# Patient Record
Sex: MALE | Race: WHITE | NOT HISPANIC OR LATINO | Employment: OTHER | URBAN - METROPOLITAN AREA
[De-identification: names, ages, dates, MRNs, and addresses within clinical notes are randomized per-mention and may not be internally consistent; named-entity substitution may affect disease eponyms.]

---

## 2017-08-18 ENCOUNTER — GENERIC CONVERSION - ENCOUNTER (OUTPATIENT)
Dept: OTHER | Facility: OTHER | Age: 82
End: 2017-08-18

## 2018-01-13 NOTE — MISCELLANEOUS
Message  The patient had missed his last appointment on 9/21/16  I spoke with the nursing home and the patient is on Hospice  [facility]      Active Problems    1  Acquired solitary kidney (V45 73) (Z90 5)   2  Allergic rhinitis (477 9) (J30 9)   3  Aneurysm of abdominal aorta (441 4) (I71 4)   4  Arteriosclerosis of coronary artery (414 00) (I25 10)   5  Asbestosis ([id number]) (Yamini Ferrera)   6  BPPV (benign paroxysmal positional vertigo) (386 11) (H81 10)   7  Cholelithiasis without cholecystitis (574 20) (K80 20)   8  Chronic rhinitis (472 0) (J31 0)   9  CKD (chronic kidney disease), stage IV (585 4) (N18 4)   10  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 2) (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 2)   11  Esophageal reflux (530 81) (K21 9)   12  Flu vaccine need (V04 81) (Z23)   13  Glaucoma (365 9) (H40 9)   14  Hyperlipidemia (272 4) (E78 5)   15  Hypertension (401 9) (I10)   16  Murmur (785 2) (R01 1)   17  History of Nocturnal hypoxia (327 24)   18  Renal insufficiency (593 9) (N28 9)   19  Shortness of breath (786 05) (R06 02)   20  Urinary retention (788 20) (R33 9)    Current Meds   1  AmLODIPine Besylate 5 MG Oral Tablet; Take 1 tablet daily; Therapy: (Recorded:23Jun2016) to Recorded   2  Aspirin 81 MG TABS; TAKE 1 TABLET DAILY; Therapy: 86Otg0103 to (Evaluate:01Kas4224) Recorded   3  Atorvastatin Calcium 40 MG Oral Tablet; TAKE ONE TABLET BY MOUTH ONCE DAILY; Therapy: 75ISE0923 to (Evaluate:19Nov2016)  Requested for: 67QDC1792; Last   Rx:25Nov2015 Ordered   4  Finasteride 5 MG Oral Tablet; TAKE 1 TABLET DAILY; Therapy: 22RNV4305 to (Evaluate:19Nov2016)  Requested for: 51IBS5552; Last   Rx:25Nov2015 Ordered   5  HydrALAZINE HCl - 10 MG Oral Tablet; TAKE 1 TABLET TWICE DAILY; Therapy: 11LGQ4577 to (Evaluate:19Nov2016)  Requested for: 53SYB2205; Last   Rx:25Nov2015 Ordered   6  Latanoprost 0 005 % Ophthalmic Solution; Therapy: 10Apr2014 to Recorded   7   Metoprolol Succinate ER 25 MG Oral Tablet Extended Release 24 Hour; TAKE 1 TABLET DAILY; Therapy: (PVGOHVZL:67KVZ6498) to Recorded   8  Omeprazole 40 MG Oral Capsule Delayed Release; TAKE 1 CAPSULE twice  DAILY; Therapy: 99ILO1382 to (Yasmeen Garsia)  Requested for: 13NDO9666; Last   Rx:02Olt0996 Ordered   9  Sodium Chloride 0 9 % Inhalation Nebulization Solution; use twice a day in nebulizer as   directed; Therapy: 74ZIJ9592 to (Last Rx:75Tty9278) Ordered    Allergies    1  No Known Drug Allergies    Signatures   Electronically signed by :  FELI Marsh ; Aug 28 2017  8:48AM EST

## 2018-08-13 ENCOUNTER — APPOINTMENT (EMERGENCY)
Dept: RADIOLOGY | Facility: HOSPITAL | Age: 83
DRG: 682 | End: 2018-08-13
Payer: COMMERCIAL

## 2018-08-13 ENCOUNTER — APPOINTMENT (INPATIENT)
Dept: CT IMAGING | Facility: HOSPITAL | Age: 83
DRG: 682 | End: 2018-08-13
Payer: COMMERCIAL

## 2018-08-13 ENCOUNTER — HOSPITAL ENCOUNTER (INPATIENT)
Facility: HOSPITAL | Age: 83
LOS: 4 days | Discharge: HOME | DRG: 682 | End: 2018-08-17
Attending: EMERGENCY MEDICINE | Admitting: HOSPITALIST
Payer: COMMERCIAL

## 2018-08-13 DIAGNOSIS — N17.9 ACUTE KIDNEY INJURY (HCC): ICD-10-CM

## 2018-08-13 DIAGNOSIS — R41.0 CONFUSION: Primary | ICD-10-CM

## 2018-08-13 DIAGNOSIS — E87.70 VOLUME OVERLOAD: ICD-10-CM

## 2018-08-13 DIAGNOSIS — I16.0 HYPERTENSIVE URGENCY: ICD-10-CM

## 2018-08-13 DIAGNOSIS — E86.0 DEHYDRATION: ICD-10-CM

## 2018-08-13 LAB
ALBUMIN SERPL BCP-MCNC: 2.9 G/DL (ref 3.5–5)
ALP SERPL-CCNC: 202 U/L (ref 46–116)
ALT SERPL W P-5'-P-CCNC: 22 U/L (ref 12–78)
ANION GAP SERPL CALCULATED.3IONS-SCNC: 11 MMOL/L (ref 4–13)
AST SERPL W P-5'-P-CCNC: 31 U/L (ref 5–45)
BACTERIA UR QL AUTO: ABNORMAL /HPF
BACTERIA UR QL AUTO: ABNORMAL /HPF
BASE EX.OXY STD BLDV CALC-SCNC: 80.4 % (ref 60–80)
BASE EXCESS BLDV CALC-SCNC: -8 MMOL/L
BASOPHILS # BLD AUTO: 0.02 THOUSANDS/ΜL (ref 0–0.1)
BASOPHILS NFR BLD AUTO: 0 % (ref 0–1)
BILIRUB DIRECT SERPL-MCNC: 0.14 MG/DL (ref 0–0.2)
BILIRUB SERPL-MCNC: 0.49 MG/DL (ref 0.2–1)
BILIRUB UR QL STRIP: NEGATIVE
BUN SERPL-MCNC: 54 MG/DL (ref 5–25)
CALCIUM SERPL-MCNC: 9.1 MG/DL (ref 8.3–10.1)
CHLORIDE SERPL-SCNC: 105 MMOL/L (ref 100–108)
CLARITY UR: CLEAR
CO2 SERPL-SCNC: 20 MMOL/L (ref 21–32)
COLOR UR: YELLOW
CREAT SERPL-MCNC: 4.32 MG/DL (ref 0.6–1.3)
EOSINOPHIL # BLD AUTO: 0.19 THOUSAND/ΜL (ref 0–0.61)
EOSINOPHIL NFR BLD AUTO: 3 % (ref 0–6)
ERYTHROCYTE [DISTWIDTH] IN BLOOD BY AUTOMATED COUNT: 13.8 % (ref 11.6–15.1)
GFR SERPL CREATININE-BSD FRML MDRD: 11 ML/MIN/1.73SQ M
GLUCOSE SERPL-MCNC: 95 MG/DL (ref 65–140)
GLUCOSE UR STRIP-MCNC: NEGATIVE MG/DL
HCO3 BLDV-SCNC: 17.5 MMOL/L (ref 24–30)
HCT VFR BLD AUTO: 31.3 % (ref 36.5–49.3)
HGB BLD-MCNC: 10.4 G/DL (ref 12–17)
HGB UR QL STRIP.AUTO: ABNORMAL
HGB UR QL STRIP.AUTO: NEGATIVE
HGB UR QL STRIP.AUTO: NEGATIVE
KETONES UR STRIP-MCNC: NEGATIVE MG/DL
LACTATE SERPL-SCNC: 1.1 MMOL/L (ref 0.5–2)
LEUKOCYTE ESTERASE UR QL STRIP: NEGATIVE
LIPASE SERPL-CCNC: 302 U/L (ref 73–393)
LYMPHOCYTES # BLD AUTO: 1.5 THOUSANDS/ΜL (ref 0.6–4.47)
LYMPHOCYTES NFR BLD AUTO: 24 % (ref 14–44)
MAGNESIUM SERPL-MCNC: 1.4 MG/DL (ref 1.6–2.6)
MCH RBC QN AUTO: 31 PG (ref 26.8–34.3)
MCHC RBC AUTO-ENTMCNC: 33.2 G/DL (ref 31.4–37.4)
MCV RBC AUTO: 93 FL (ref 82–98)
MONOCYTES # BLD AUTO: 0.71 THOUSAND/ΜL (ref 0.17–1.22)
MONOCYTES NFR BLD AUTO: 11 % (ref 4–12)
NEUTROPHILS # BLD AUTO: 3.84 THOUSANDS/ΜL (ref 1.85–7.62)
NEUTS SEG NFR BLD AUTO: 61 % (ref 43–75)
NITRITE UR QL STRIP: NEGATIVE
NON-SQ EPI CELLS URNS QL MICRO: ABNORMAL /HPF
NON-SQ EPI CELLS URNS QL MICRO: ABNORMAL /HPF
NRBC BLD AUTO-RTO: 0 /100 WBCS
NT-PROBNP SERPL-MCNC: ABNORMAL PG/ML
O2 CT BLDV-SCNC: 12.8 ML/DL
PCO2 BLDV: 35.8 MM HG (ref 42–50)
PH BLDV: 7.31 [PH] (ref 7.3–7.4)
PH UR STRIP.AUTO: 5.5 [PH] (ref 4.5–8)
PH UR STRIP.AUTO: 6 [PH] (ref 4.5–8)
PH UR STRIP.AUTO: 6 [PH] (ref 4.5–8)
PLATELET # BLD AUTO: 183 THOUSANDS/UL (ref 149–390)
PLATELET # BLD AUTO: 183 THOUSANDS/UL (ref 149–390)
PMV BLD AUTO: 11.9 FL (ref 8.9–12.7)
PMV BLD AUTO: 12.1 FL (ref 8.9–12.7)
PO2 BLDV: 46.1 MM HG (ref 35–45)
POTASSIUM SERPL-SCNC: 4.8 MMOL/L (ref 3.5–5.3)
PROT SERPL-MCNC: 6.7 G/DL (ref 6.4–8.2)
PROT UR STRIP-MCNC: >=300 MG/DL
PROT UR STRIP-MCNC: ABNORMAL MG/DL
PROT UR STRIP-MCNC: ABNORMAL MG/DL
RBC # BLD AUTO: 3.35 MILLION/UL (ref 3.88–5.62)
RBC #/AREA URNS AUTO: ABNORMAL /HPF
RBC #/AREA URNS AUTO: ABNORMAL /HPF
SODIUM SERPL-SCNC: 136 MMOL/L (ref 136–145)
SP GR UR STRIP.AUTO: 1.02 (ref 1–1.03)
TROPONIN I SERPL-MCNC: 0.05 NG/ML
TSH SERPL DL<=0.05 MIU/L-ACNC: 6.2 UIU/ML (ref 0.36–3.74)
UROBILINOGEN UR QL STRIP.AUTO: 0.2 E.U./DL
WBC # BLD AUTO: 6.26 THOUSAND/UL (ref 4.31–10.16)
WBC #/AREA URNS AUTO: ABNORMAL /HPF
WBC #/AREA URNS AUTO: ABNORMAL /HPF

## 2018-08-13 PROCEDURE — 83880 ASSAY OF NATRIURETIC PEPTIDE: CPT | Performed by: EMERGENCY MEDICINE

## 2018-08-13 PROCEDURE — 80076 HEPATIC FUNCTION PANEL: CPT | Performed by: EMERGENCY MEDICINE

## 2018-08-13 PROCEDURE — 80048 BASIC METABOLIC PNL TOTAL CA: CPT | Performed by: EMERGENCY MEDICINE

## 2018-08-13 PROCEDURE — 96360 HYDRATION IV INFUSION INIT: CPT

## 2018-08-13 PROCEDURE — 82805 BLOOD GASES W/O2 SATURATION: CPT | Performed by: EMERGENCY MEDICINE

## 2018-08-13 PROCEDURE — 84443 ASSAY THYROID STIM HORMONE: CPT | Performed by: EMERGENCY MEDICINE

## 2018-08-13 PROCEDURE — 83605 ASSAY OF LACTIC ACID: CPT | Performed by: EMERGENCY MEDICINE

## 2018-08-13 PROCEDURE — 70450 CT HEAD/BRAIN W/O DYE: CPT

## 2018-08-13 PROCEDURE — 81001 URINALYSIS AUTO W/SCOPE: CPT | Performed by: HOSPITALIST

## 2018-08-13 PROCEDURE — 99285 EMERGENCY DEPT VISIT HI MDM: CPT

## 2018-08-13 PROCEDURE — 84484 ASSAY OF TROPONIN QUANT: CPT | Performed by: EMERGENCY MEDICINE

## 2018-08-13 PROCEDURE — 96361 HYDRATE IV INFUSION ADD-ON: CPT

## 2018-08-13 PROCEDURE — 96374 THER/PROPH/DIAG INJ IV PUSH: CPT

## 2018-08-13 PROCEDURE — 85049 AUTOMATED PLATELET COUNT: CPT | Performed by: HOSPITALIST

## 2018-08-13 PROCEDURE — 99223 1ST HOSP IP/OBS HIGH 75: CPT | Performed by: HOSPITALIST

## 2018-08-13 PROCEDURE — 83735 ASSAY OF MAGNESIUM: CPT | Performed by: EMERGENCY MEDICINE

## 2018-08-13 PROCEDURE — 87040 BLOOD CULTURE FOR BACTERIA: CPT | Performed by: HOSPITALIST

## 2018-08-13 PROCEDURE — 71045 X-RAY EXAM CHEST 1 VIEW: CPT

## 2018-08-13 PROCEDURE — 83690 ASSAY OF LIPASE: CPT | Performed by: EMERGENCY MEDICINE

## 2018-08-13 PROCEDURE — 85025 COMPLETE CBC W/AUTO DIFF WBC: CPT | Performed by: EMERGENCY MEDICINE

## 2018-08-13 PROCEDURE — 87040 BLOOD CULTURE FOR BACTERIA: CPT | Performed by: EMERGENCY MEDICINE

## 2018-08-13 PROCEDURE — 36415 COLL VENOUS BLD VENIPUNCTURE: CPT | Performed by: EMERGENCY MEDICINE

## 2018-08-13 PROCEDURE — 81001 URINALYSIS AUTO W/SCOPE: CPT

## 2018-08-13 PROCEDURE — 93005 ELECTROCARDIOGRAM TRACING: CPT

## 2018-08-13 RX ORDER — DORZOLAMIDE HCL 20 MG/ML
1 SOLUTION/ DROPS OPHTHALMIC 2 TIMES DAILY
COMMUNITY

## 2018-08-13 RX ORDER — AMLODIPINE BESYLATE 5 MG/1
5 TABLET ORAL DAILY
Status: DISCONTINUED | OUTPATIENT
Start: 2018-08-14 | End: 2018-08-14

## 2018-08-13 RX ORDER — TIMOLOL MALEATE/LATANOPROST/PF 0.5-0.005%
DROPS OPHTHALMIC (EYE)
COMMUNITY

## 2018-08-13 RX ORDER — MINERAL OIL AND PETROLATUM 150; 830 MG/G; MG/G
OINTMENT OPHTHALMIC
Status: DISCONTINUED | OUTPATIENT
Start: 2018-08-13 | End: 2018-08-17 | Stop reason: HOSPADM

## 2018-08-13 RX ORDER — HEPARIN SODIUM 5000 [USP'U]/ML
5000 INJECTION, SOLUTION INTRAVENOUS; SUBCUTANEOUS EVERY 8 HOURS SCHEDULED
Status: DISCONTINUED | OUTPATIENT
Start: 2018-08-13 | End: 2018-08-17 | Stop reason: HOSPADM

## 2018-08-13 RX ORDER — HYDRALAZINE HYDROCHLORIDE 25 MG/1
25 TABLET, FILM COATED ORAL 4 TIMES DAILY
Status: DISCONTINUED | OUTPATIENT
Start: 2018-08-13 | End: 2018-08-17 | Stop reason: HOSPADM

## 2018-08-13 RX ORDER — MINERAL OIL AND PETROLATUM 150; 830 MG/G; MG/G
OINTMENT OPHTHALMIC
COMMUNITY

## 2018-08-13 RX ORDER — ONDANSETRON 4 MG/1
4 TABLET, FILM COATED ORAL EVERY 8 HOURS PRN
COMMUNITY

## 2018-08-13 RX ORDER — FINASTERIDE 5 MG/1
5 TABLET, FILM COATED ORAL DAILY
Status: DISCONTINUED | OUTPATIENT
Start: 2018-08-14 | End: 2018-08-17 | Stop reason: HOSPADM

## 2018-08-13 RX ORDER — DORZOLAMIDE HCL 20 MG/ML
1 SOLUTION/ DROPS OPHTHALMIC 2 TIMES DAILY
Status: DISCONTINUED | OUTPATIENT
Start: 2018-08-13 | End: 2018-08-13 | Stop reason: SDUPTHER

## 2018-08-13 RX ORDER — AMLODIPINE BESYLATE 5 MG/1
5 TABLET ORAL DAILY
Status: ON HOLD | COMMUNITY
End: 2018-08-17

## 2018-08-13 RX ORDER — PANTOPRAZOLE SODIUM 20 MG/1
20 TABLET, DELAYED RELEASE ORAL
Status: DISCONTINUED | OUTPATIENT
Start: 2018-08-14 | End: 2018-08-17 | Stop reason: HOSPADM

## 2018-08-13 RX ORDER — ONDANSETRON 2 MG/ML
4 INJECTION INTRAMUSCULAR; INTRAVENOUS EVERY 6 HOURS PRN
Status: DISCONTINUED | OUTPATIENT
Start: 2018-08-13 | End: 2018-08-17 | Stop reason: HOSPADM

## 2018-08-13 RX ORDER — LATANOPROST 50 UG/ML
1 SOLUTION/ DROPS OPHTHALMIC
Status: DISCONTINUED | OUTPATIENT
Start: 2018-08-13 | End: 2018-08-17 | Stop reason: HOSPADM

## 2018-08-13 RX ORDER — DORZOLAMIDE HYDROCHLORIDE AND TIMOLOL MALEATE 20; 5 MG/ML; MG/ML
1 SOLUTION/ DROPS OPHTHALMIC 2 TIMES DAILY
Status: DISCONTINUED | OUTPATIENT
Start: 2018-08-13 | End: 2018-08-17 | Stop reason: HOSPADM

## 2018-08-13 RX ORDER — SODIUM CHLORIDE 9 MG/ML
100 INJECTION, SOLUTION INTRAVENOUS CONTINUOUS
Status: DISCONTINUED | OUTPATIENT
Start: 2018-08-13 | End: 2018-08-15

## 2018-08-13 RX ORDER — ATORVASTATIN CALCIUM 40 MG/1
40 TABLET, FILM COATED ORAL DAILY
Status: DISCONTINUED | OUTPATIENT
Start: 2018-08-14 | End: 2018-08-17 | Stop reason: HOSPADM

## 2018-08-13 RX ORDER — ASPIRIN 81 MG/1
81 TABLET ORAL DAILY
Status: DISCONTINUED | OUTPATIENT
Start: 2018-08-14 | End: 2018-08-17 | Stop reason: HOSPADM

## 2018-08-13 RX ORDER — ACETAMINOPHEN 650 MG/1
650 SUPPOSITORY RECTAL EVERY 4 HOURS PRN
COMMUNITY

## 2018-08-13 RX ORDER — ACETAMINOPHEN 650 MG/1
650 SUPPOSITORY RECTAL EVERY 4 HOURS PRN
Status: DISCONTINUED | OUTPATIENT
Start: 2018-08-13 | End: 2018-08-17 | Stop reason: HOSPADM

## 2018-08-13 RX ORDER — ACETAMINOPHEN 325 MG/1
325 TABLET ORAL EVERY 8 HOURS PRN
Status: DISCONTINUED | OUTPATIENT
Start: 2018-08-13 | End: 2018-08-17 | Stop reason: HOSPADM

## 2018-08-13 RX ORDER — HYDRALAZINE HYDROCHLORIDE 20 MG/ML
5 INJECTION INTRAMUSCULAR; INTRAVENOUS ONCE
Status: COMPLETED | OUTPATIENT
Start: 2018-08-13 | End: 2018-08-13

## 2018-08-13 RX ORDER — METOPROLOL SUCCINATE 25 MG/1
25 TABLET, EXTENDED RELEASE ORAL DAILY
Status: DISCONTINUED | OUTPATIENT
Start: 2018-08-14 | End: 2018-08-17 | Stop reason: HOSPADM

## 2018-08-13 RX ADMIN — HYDRALAZINE HYDROCHLORIDE 5 MG: 20 INJECTION INTRAMUSCULAR; INTRAVENOUS at 16:28

## 2018-08-13 RX ADMIN — HEPARIN SODIUM 5000 UNITS: 5000 INJECTION, SOLUTION INTRAVENOUS; SUBCUTANEOUS at 21:44

## 2018-08-13 RX ADMIN — SODIUM CHLORIDE 1000 ML: 0.9 INJECTION, SOLUTION INTRAVENOUS at 14:26

## 2018-08-13 RX ADMIN — SODIUM CHLORIDE 100 ML/HR: 0.9 INJECTION, SOLUTION INTRAVENOUS at 23:33

## 2018-08-13 RX ADMIN — SODIUM CHLORIDE 100 ML/HR: 0.9 INJECTION, SOLUTION INTRAVENOUS at 18:10

## 2018-08-13 RX ADMIN — DORZOLAMIDE HYDROCHLORIDE AND TIMOLOL MALEATE 1 DROP: 20; 5 SOLUTION/ DROPS OPHTHALMIC at 21:39

## 2018-08-13 RX ADMIN — MINERAL OIL AND WHITE PETROLATUM: 30; 940 OINTMENT OPHTHALMIC at 21:43

## 2018-08-13 RX ADMIN — HYDRALAZINE HYDROCHLORIDE 25 MG: 25 TABLET, FILM COATED ORAL at 21:42

## 2018-08-13 RX ADMIN — HYDRALAZINE HYDROCHLORIDE 25 MG: 25 TABLET, FILM COATED ORAL at 18:32

## 2018-08-13 RX ADMIN — LATANOPROST 1 DROP: 50 SOLUTION OPHTHALMIC at 21:39

## 2018-08-13 NOTE — ED NOTES
Patient found sitting at end of stretcher, with IV ripped out and mumbling about working at a [address] [place] [address]  Patient confused and wondering why he is here  Patient cleaned up and linens on bed changed        Ilia Muñoz RN  08/13/18 4476

## 2018-08-13 NOTE — PLAN OF CARE
INFECTION - ADULT     Absence or prevention of progression during hospitalization Progressing        PAIN - ADULT     Verbalizes/displays adequate comfort level or baseline comfort level Progressing        SAFETY ADULT     Patient will remain free of falls Progressing     Maintain or return to baseline ADL function Progressing     Maintain or return mobility status to optimal level Progressing

## 2018-08-13 NOTE — ED PROVIDER NOTES
History  Chief Complaint   Patient presents with    Altered Mental Status     pt present by EMS from [facility]  staff reports recent change in mental status today  EMS reports pt was found sleeping in other person room upon arrival  pt is normally alert and oriented  pt is disoriented to situation at this time  79 YO male presents from [facility] for increasing confusion  On arrival to the ED pt is not able to give much Hx, he is somnolent, slow to respond  Per NH pt is usually mentally aware, over the last few days he has had increased episodes of confusion, hallucinations which staff is unable to describe  Pt denies pain but states he just does not feel well  He has had decreased PO intake, concern for possible dehydration  Per staff they were considering a hospice consultation but when pt stated he did not feel well this morning decided to send to the ED  Pt denies chest pain or shortness of breath at this time  States he does feel generally weak  Pt denies CP/SOB/F/C/N/V/D/C, no dysuria, burning on urination or blood in urine  History provided by:  Patient, nursing home and relative   used: No    Altered Mental Status   Presenting symptoms: confusion    Severity:  Moderate  Most recent episode:  More than 2 days ago  Timing:  Intermittent  Progression:  Waxing and waning  Chronicity:  New  Context: nursing home resident    Associated symptoms: decreased appetite and weakness    Associated symptoms: no abdominal pain, no agitation, no bladder incontinence, no fever, no light-headedness, no nausea, no palpitations, no rash, no visual change and no vomiting    Weakness:     Severity:  Moderate    Onset quality:  Gradual    Timing:  Constant    Progression:  Waxing and waning    Chronicity:  New      Prior to Admission Medications   Prescriptions Last Dose Informant Patient Reported? Taking?    Latanoprost-Timolol Maleate 0 005-0 5 % SOLN   Yes Yes   Sig: Apply to eye acetaminophen (TYLENOL) 650 mg suppository   Yes Yes   Sig: Insert 650 mg into the rectum every 4 (four) hours as needed for mild pain   amLODIPine (NORVASC) 5 mg tablet   Yes Yes   Sig: Take 5 mg by mouth daily   artificial tear (LUBRIFRESH P M ) 83-15 % ophthalmic ointment   Yes Yes   Sig: daily at bedtime   aspirin 81 MG tablet   Yes Yes   Sig: Take 81 mg by mouth daily  atorvastatin (LIPITOR) 40 mg tablet   Yes No   Sig: Take 40 mg by mouth daily  dorzolamide (TRUSOPT) 2 % ophthalmic solution   Yes Yes   Si drop 2 (two) times a day   finasteride (PROSCAR) 5 mg tablet   Yes Yes   Sig: Take 5 mg by mouth daily  hydrALAZINE (APRESOLINE) 10 mg tablet   Yes No   Sig: Take 25 mg by mouth 4 (four) times a day     hyoscyamine (LEVSIN/SL) 0 125 mg SL tablet   Yes Yes   Sig: Take 0 125 mg by mouth every 4 (four) hours as needed for cramping   latanoprost (XALATAN) 0 005 % ophthalmic solution   Yes No   Sig: Administer 1 drop to the right eye daily at bedtime  metoprolol succinate (TOPROL-XL) 25 mg 24 hr tablet   No Yes   Sig: Take 1 tablet (25 mg total) by mouth daily  omeprazole (PriLOSEC) 40 MG capsule   Yes Yes   Sig: Take 40 mg by mouth daily  ondansetron (ZOFRAN) 4 mg tablet   Yes Yes   Sig: Take 4 mg by mouth every 8 (eight) hours as needed for nausea or vomiting      Facility-Administered Medications: None       Past Medical History:   Diagnosis Date    [facility]     Hypercholesteremia     Hypertension     [facility]        Past Surgical History:   Procedure Laterality Date    CARDIAC SURGERY      CABG x4    EYE SURGERY      NEPHRECTOMY         Family History   Problem Relation Age of Onset    Family history unknown: Yes     I have reviewed and agree with the history as documented      Social History   Substance Use Topics    Smoking status: Never Smoker    Smokeless tobacco: Not on file    Alcohol use No        Review of Systems   Constitutional: Positive for decreased appetite  Negative for fever  HENT: Negative for dental problem  Eyes: Negative for visual disturbance  Respiratory: Negative for shortness of breath  Cardiovascular: Negative for chest pain and palpitations  Gastrointestinal: Negative for abdominal pain, nausea and vomiting  Genitourinary: Negative for bladder incontinence, dysuria and frequency  Musculoskeletal: Negative for neck pain and neck stiffness  Skin: Negative for rash  Neurological: Positive for weakness  Negative for dizziness and light-headedness  Psychiatric/Behavioral: Positive for confusion  Negative for agitation and behavioral problems  All other systems reviewed and are negative  Physical Exam  Physical Exam   Constitutional: He is oriented to person, place, and time  He appears well-developed and well-nourished  HENT:   Head: Normocephalic and atraumatic  Eyes: EOM are normal  Pupils are equal, round, and reactive to light  Neck: Normal range of motion  Cardiovascular: Normal rate, regular rhythm and normal heart sounds  Pulmonary/Chest: Effort normal and breath sounds normal    Abdominal: Soft  There is no tenderness  Musculoskeletal: Normal range of motion  Neurological: He is alert and oriented to person, place, and time  Skin: Skin is warm and dry  Psychiatric: He has a normal mood and affect  His behavior is normal    Nursing note and vitals reviewed        Vital Signs  ED Triage Vitals   Temperature Pulse Respirations Blood Pressure SpO2   08/13/18 1326 08/13/18 1326 08/13/18 1326 08/13/18 1326 08/13/18 1326   98 1 °F (36 7 °C) 65 18 (!) 197/98 96 %      Temp Source Heart Rate Source Patient Position - Orthostatic VS BP Location FiO2 (%)   08/13/18 1326 08/13/18 1326 08/13/18 1326 08/13/18 1326 --   Temporal Monitor Lying Left arm       Pain Score       08/13/18 1522       No Pain           Vitals:    08/13/18 1600 08/13/18 1628 08/13/18 1645 08/13/18 1738   BP: (!) 201/100 (!) 188/92 (!) 181/90 (!) 179/97   Pulse: 74 68 78 72   Patient Position - Orthostatic VS: Lying Sitting Lying Lying       Visual Acuity  Visual Acuity      Most Recent Value   L Pupil Size (mm)  2   R Pupil Size (mm)  2          ED Medications  Medications   sodium chloride 0 9 % bolus 1,000 mL (1,000 mL Intravenous New Bag 8/13/18 1426)   hydrALAZINE (APRESOLINE) injection 5 mg (5 mg Intravenous Given 8/13/18 1628)       Diagnostic Studies  Results Reviewed     Procedure Component Value Units Date/Time    Urine Microscopic [[id number]]  (Abnormal) Collected:  08/13/18 1536    Lab Status:  Final result Specimen:  Urine from Urine, Clean Catch Updated:  08/13/18 1558     RBC, UA 1-2 (A) /hpf      WBC, UA 1-2 (A) /hpf      Epithelial Cells Occasional /hpf      Bacteria, UA Occasional /hpf     POCT urinalysis dipstick [[id number]]  (Abnormal) Resulted:  08/13/18 1530    Lab Status:  Final result Specimen:  Urine Updated:  08/13/18 1530    ED Urine Macroscopic [[id number]]  (Abnormal) Collected:  08/13/18 1536    Lab Status:  Final result Specimen:  Urine Updated:  08/13/18 1529     Color, UA Yellow     Clarity, UA Clear     pH, UA 5 5     Leukocytes, UA Negative     Nitrite, UA Negative     Protein, UA >=300 (A) mg/dl      Glucose, UA Negative mg/dl      Ketones, UA Negative mg/dl      Urobilinogen, UA 0 2 E U /dl      Bilirubin, UA Negative     Blood, UA Trace (A)     Specific [place], UA 1 020    Narrative:       CLINITEK RESULT    B-type natriuretic peptide [[id number]]  (Abnormal) Collected:  08/13/18 1353    Lab Status:  Final result Specimen:  Blood from Arm, Right Updated:  08/13/18 1504     NT-proBNP 24,740 (H) pg/mL     Basic metabolic panel [[id number]]  (Abnormal) Collected:  08/13/18 1353    Lab Status:  Final result Specimen:  Blood from Arm, Right Updated:  08/13/18 1438     Sodium 136 mmol/L      Potassium 4 8 mmol/L      Chloride 105 mmol/L      CO2 20 (L) mmol/L      Anion Gap 11 mmol/L      BUN 54 (H) mg/dL Creatinine 4 32 (H) mg/dL      Glucose 95 mg/dL      Calcium 9 1 mg/dL      eGFR 11 ml/min/1 73sq m     Narrative:         National Kidney Disease Education Program recommendations are as follows:  GFR calculation is accurate only with a steady state creatinine  Chronic Kidney disease less than 60 ml/min/1 73 sq  meters  Kidney failure less than 15 ml/min/1 73 sq  meters  Hepatic function panel [[id number]]  (Abnormal) Collected:  08/13/18 1353    Lab Status:  Final result Specimen:  Blood from Arm, Right Updated:  08/13/18 1438     Total Bilirubin 0 49 mg/dL      Bilirubin, Direct 0 14 mg/dL      Alkaline Phosphatase 202 (H) U/L      AST 31 U/L      ALT 22 U/L      Total Protein 6 7 g/dL      Albumin 2 9 (L) g/dL     TSH [[id number]]  (Abnormal) Collected:  08/13/18 1353    Lab Status:  Final result Specimen:  Blood from Arm, Right Updated:  08/13/18 1438     TSH 3RD GENERATON 6 198 (H) uIU/mL     Narrative:         Patients undergoing fluorescein dye angiography may retain small amounts of fluorescein in the body for 48-72 hours post procedure  Samples containing fluorescein can produce falsely depressed TSH values  If the patient had this procedure,a specimen should be resubmitted post fluorescein clearance      Magnesium [[id number]]  (Abnormal) Collected:  08/13/18 1353    Lab Status:  Final result Specimen:  Blood from Arm, Right Updated:  08/13/18 1438     Magnesium 1 4 (L) mg/dL     Lipase [[id number]]  (Normal) Collected:  08/13/18 1353    Lab Status:  Final result Specimen:  Blood from Arm, Right Updated:  08/13/18 1438     Lipase 302 u/L     Blood gas, venous [[id number]]  (Abnormal) Collected:  08/13/18 1426    Lab Status:  Final result Specimen:  Blood from Arm, Right Updated:  08/13/18 1436     pH, Ata 7 308     pCO2, Ata 35 8 (L) mm Hg      pO2, Ata 46 1 (H) mm Hg      HCO3, Ata 17 5 (L) mmol/L      Base Excess, Ata -8 0 mmol/L      O2 Content, Ata 12 8 ml/dL      O2 HGB, VENOUS 80 4 (H) %     Blood culture #1 [[id number]] Collected:  08/13/18 1426    Lab Status: In process Specimen:  Blood from Arm, Left Updated:  08/13/18 1431    Lactic acid, plasma [[id number]]  (Normal) Collected:  08/13/18 1353    Lab Status:  Final result Specimen:  Blood from Arm, Right Updated:  08/13/18 1426     LACTIC ACID 1 1 mmol/L     Narrative:         Result may be elevated if tourniquet was used during collection  Troponin I [[id number]]  (Abnormal) Collected:  08/13/18 1353    Lab Status:  Final result Specimen:  Blood from Arm, Right Updated:  08/13/18 1419     Troponin I 0 05 (H) ng/mL     CBC and differential [[id number]]  (Abnormal) Collected:  08/13/18 1353    Lab Status:  Final result Specimen:  Blood from Arm, Right Updated:  08/13/18 1408     WBC 6 26 Thousand/uL      RBC 3 35 (L) Million/uL      Hemoglobin 10 4 (L) g/dL      Hematocrit 31 3 (L) %      MCV 93 fL      MCH 31 0 pg      MCHC 33 2 g/dL      RDW 13 8 %      MPV 11 9 fL      Platelets 527 Thousands/uL      nRBC 0 /100 WBCs      Neutrophils Relative 61 %      Lymphocytes Relative 24 %      Monocytes Relative 11 %      Eosinophils Relative 3 %      Basophils Relative 0 %      Neutrophils Absolute 3 84 Thousands/µL      Lymphocytes Absolute 1 50 Thousands/µL      Monocytes Absolute 0 71 Thousand/µL      Eosinophils Absolute 0 19 Thousand/µL      Basophils Absolute 0 02 Thousands/µL     Blood culture #2 [[id number]] Collected:  08/13/18 1324    Lab Status: In process Specimen:  Blood from Arm, Right Updated:  08/13/18 1358                 XR chest 1 view portable   Final Result by May Ferguson MD (08/13 1543)      No acute cardiopulmonary disease  Asbestos-related pleural disease              Workstation performed: [id number]         CT head wo contrast    (Results Pending)   US retroperitoneal complete    (Results Pending)              Procedures  Procedures       Phone Contacts  ED Phone Contact    ED Course                               MDM  Number of Diagnoses or Management Options  Confusion: new and requires workup  Dehydration: new and requires workup  Diagnosis management comments: 1  Confusion - On initial presentation pt is somnolent but answers questions appropriately, complains of weakness  Will check urine for infection, CXR for PNA, electrolytes for dehydration and abnormalities, CBC for anemia  Will check ECG and Troponin as well as BNP  On re-evaluation prior to admission, pt is more alert, sitting up in bed  He does not recall meeting myself or our conversation, asking about what happened earlier with all the people around him, states he does not recognize me as I change clothes  Pt is ok with admission  Amount and/or Complexity of Data Reviewed  Clinical lab tests: ordered and reviewed  Tests in the radiology section of CPT®: ordered and reviewed  Obtain history from someone other than the patient: yes  Discuss the patient with other providers: yes  Independent visualization of images, tracings, or specimens: yes    Patient Progress  Patient progress: stable    CritCare Time    Disposition  Final diagnoses:   Confusion   Dehydration     Time reflects when diagnosis was documented in both MDM as applicable and the Disposition within this note     Time User Action Codes Description Comment    8/13/2018  4:31 PM Donnie Joseph, [address] [R41 0] Confusion     8/13/2018  4:31 PM Donnie Joseph, [address] [E86 0] Dehydration       ED Disposition     ED Disposition Condition Comment    Admit  Case was discussed with Dr Owen Tijerina and the patient's admission status was agreed to be Admission Status: inpatient status to the service of Dr Owen Tijerina   Follow-up Information    None         Patient's Medications   Discharge Prescriptions    No medications on file     No discharge procedures on file      ED Provider  Electronically Signed by           Adriana Castelan MD  08/13/18 9676

## 2018-08-13 NOTE — H&P
H&P- Nam Carr 6/14/1922, 80 y o  male MRN: [id number]    Unit/Bed#: ED 16 Encounter: [id number]    Primary Care Provider: Alana Rios MD   Date and time admitted to hospital: 8/13/2018  1:22 PM        * Metabolic encephalopathy   Assessment & Plan    Likely secondary to dehydration  Check CT head  panculture  Hold off on antibiotics for now as I do not have any source of infection  Gentle IV fluids        Acute kidney injury ([address] )   Assessment & Plan    Likely due to dehydration  Gentle IV fluids  Check renal u/s   Follow Cr        CKD (chronic kidney disease) stage 3, GFR 30-59 ml/min   Assessment & Plan    Mildly worse due to dehydration  Check renal u/s            Chief Complaint:  Confusion, weakness, hallucinations      History of Present Illness:    Nam Carr is a 80 y o  male who presents with confusion  Patient was at [facility]  History is mostly per his daughter  Patient has been walking in other patient's rooms  He has been hallucinating  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  Seeing things that are not there  His daughter states this is a dramatic change from his normal   Happened about 3 days ago  No new medications  Daughter thinks he is dehydrated  She has been pushing him did drink liquids but he often refuses to drink  He states this is not thirsty  He has no problem with swallowing  He denies dysuria  No fever or chills  No diarrhea  However, his history is limited with his confusion, he is a poor historian  He denies headache  No vision changes  No neck stiffness  He does have some dementia but is not normally this confused  This is number up to change         Review of Systems:    Review of Systems   Constitutional: Positive for fatigue  HENT: Negative  Eyes: Negative  Respiratory: Negative  Cardiovascular: Negative  Gastrointestinal: Negative  Endocrine: Negative  Genitourinary: Negative  Negative for dysuria     Psychiatric/Behavioral: Positive for confusion and hallucinations  All other systems reviewed and are negative  Past Medical and Surgical History:     Past Medical History:   Diagnosis Date    [facility]     Hypercholesteremia     Hypertension     Renal cell carcinoma ([facility])        Past Surgical History:   Procedure Laterality Date    CARDIAC SURGERY      CABG x4    EYE SURGERY      NEPHRECTOMY           Home Medications:    Prior to Admission medications    Medication Sig Start Date End Date Taking? Authorizing Provider   acetaminophen (TYLENOL) 650 mg suppository Insert 650 mg into the rectum every 4 (four) hours as needed for mild pain   Yes Historical Provider, MD   amLODIPine (NORVASC) 5 mg tablet Take 5 mg by mouth daily   Yes Historical Provider, MD   artificial tear (LUBRIFRESH P M ) 83-15 % ophthalmic ointment daily at bedtime   Yes Historical Provider, MD   aspirin 81 MG tablet Take 81 mg by mouth daily  Yes Historical Provider, MD   dorzolamide (TRUSOPT) 2 % ophthalmic solution 1 drop 2 (two) times a day   Yes Historical Provider, MD   finasteride (PROSCAR) 5 mg tablet Take 5 mg by mouth daily  Yes Historical Provider, MD   hyoscyamine (LEVSIN/SL) 0 125 mg SL tablet Take 0 125 mg by mouth every 4 (four) hours as needed for cramping   Yes Historical Provider, MD   Latanoprost-Timolol Maleate 0 005-0 5 % SOLN Apply to eye   Yes Historical Provider, MD   metoprolol succinate (TOPROL-XL) 25 mg 24 hr tablet Take 1 tablet (25 mg total) by mouth daily  6/19/16  Yes Keven Reid DO   omeprazole (PriLOSEC) 40 MG capsule Take 40 mg by mouth daily  Yes Historical Provider, MD   ondansetron (ZOFRAN) 4 mg tablet Take 4 mg by mouth every 8 (eight) hours as needed for nausea or vomiting   Yes Historical Provider, MD   atorvastatin (LIPITOR) 40 mg tablet Take 40 mg by mouth daily      Historical Provider, MD   hydrALAZINE (APRESOLINE) 10 mg tablet Take 25 mg by mouth 4 (four) times a day      Historical Provider, MD latanoprost (XALATAN) 0 005 % ophthalmic solution Administer 1 drop to the right eye daily at bedtime  Historical Provider, MD     I have reviewed home medications with patient personally  Allergies: No Known Allergies      Social History:    Substance Use History:   History   Alcohol Use No     History   Smoking Status    Never Smoker   Smokeless Tobacco    Not on file     History   Drug Use No         Family History:    non-contributory      Physical Exam:     Vitals:   Blood Pressure: (!) 181/90 (08/13/18 1645)  Pulse: 78 (08/13/18 1645)  Temperature: 98 1 °F (36 7 °C) (08/13/18 1326)  Temp Source: Temporal (08/13/18 1326)  Respirations: 20 (08/13/18 1645)  Weight - Scale: 74 kg (163 lb 2 3 oz) (08/13/18 1326)  SpO2: 96 % (08/13/18 1645)    Physical Exam   HENT:   Head: Normocephalic and atraumatic  Eyes: EOM are normal  Pupils are equal, round, and reactive to light  Cardiovascular: Normal rate and regular rhythm  Exam reveals no gallop and no friction rub  No murmur heard  Pulmonary/Chest: Effort normal and breath sounds normal  He has no wheezes  He has no rales  Abdominal: Soft  There is no tenderness  Musculoskeletal: He exhibits no edema  Neurological: He is alert  He has normal reflexes  No cranial nerve deficit  Confused  Thinks he is in the [address] [place] [address]  Oriented to person only   Nursing note and vitals reviewed       Additional Data:     Lab Results: I have personally reviewed pertinent reports          Results from last 7 days  Lab Units 08/13/18  1353   WBC Thousand/uL 6 26   HEMOGLOBIN g/dL 10 4*   HEMATOCRIT % 31 3*   PLATELETS Thousands/uL 183   NEUTROS PCT % 61   LYMPHS PCT % 24   MONOS PCT % 11   EOS PCT % 3       Results from last 7 days  Lab Units 08/13/18  1353   SODIUM mmol/L 136   POTASSIUM mmol/L 4 8   CHLORIDE mmol/L 105   CO2 mmol/L 20*   BUN mg/dL 54*   CREATININE mg/dL 4 32*   CALCIUM mg/dL 9 1   TOTAL PROTEIN g/dL 6 7   BILIRUBIN TOTAL mg/dL 0 49   ALK PHOS U/L 202*   ALT U/L 22   AST U/L 31   GLUCOSE RANDOM mg/dL 95                     Imaging: I have personally reviewed pertinent reports  XR chest 1 view portable   Final Result by Yogesh Sinha MD (08/13 3293)      No acute cardiopulmonary disease  Asbestos-related pleural disease  Workstation performed: [id number]         CT head wo contrast    (Results Pending)             VTE Prophylaxis: Heparin        Anticipated Length of Stay:  Patient will be admitted on an Inpatient basis with an anticipated length of stay of greater than 2 midnights  Justification for Hospital Stay:  Patient is metabolic encephalopathy, acute kidney injury  He needs workup for this confusion as well as IV fluids  Anticipate his length of stay will be greater than 2 midnights      Total Time for Visit, including Counseling / Coordination of Care: 45 minutes  Greater than 50% of this total time spent on direct patient counseling and coordination of care  ** Please Note: This note has been constructed using a voice recognition system   **

## 2018-08-13 NOTE — ED NOTES
Dr Donnie Huitron aware of patients most recent BP   No further orders at this time     Mely Cardenas, RN  08/13/18 4301

## 2018-08-13 NOTE — ASSESSMENT & PLAN NOTE
Likely secondary to dehydration  Check CT head  panculture  Hold off on antibiotics for now as I do not have any source of infection  Gentle IV fluids

## 2018-08-14 ENCOUNTER — APPOINTMENT (INPATIENT)
Dept: ULTRASOUND IMAGING | Facility: HOSPITAL | Age: 83
DRG: 682 | End: 2018-08-14
Payer: COMMERCIAL

## 2018-08-14 PROBLEM — I16.0 HYPERTENSIVE URGENCY: Status: ACTIVE | Noted: 2018-08-14

## 2018-08-14 PROBLEM — E83.42 HYPOMAGNESEMIA: Status: ACTIVE | Noted: 2018-08-14

## 2018-08-14 LAB
ANION GAP SERPL CALCULATED.3IONS-SCNC: 13 MMOL/L (ref 4–13)
BASOPHILS # BLD AUTO: 0.03 THOUSANDS/ΜL (ref 0–0.1)
BASOPHILS NFR BLD AUTO: 1 % (ref 0–1)
BUN SERPL-MCNC: 49 MG/DL (ref 5–25)
CALCIUM SERPL-MCNC: 8.6 MG/DL (ref 8.3–10.1)
CHLORIDE SERPL-SCNC: 108 MMOL/L (ref 100–108)
CO2 SERPL-SCNC: 19 MMOL/L (ref 21–32)
CREAT SERPL-MCNC: 4.14 MG/DL (ref 0.6–1.3)
EOSINOPHIL # BLD AUTO: 0.29 THOUSAND/ΜL (ref 0–0.61)
EOSINOPHIL NFR BLD AUTO: 5 % (ref 0–6)
ERYTHROCYTE [DISTWIDTH] IN BLOOD BY AUTOMATED COUNT: 13.9 % (ref 11.6–15.1)
GFR SERPL CREATININE-BSD FRML MDRD: 11 ML/MIN/1.73SQ M
GLUCOSE SERPL-MCNC: 87 MG/DL (ref 65–140)
HCT VFR BLD AUTO: 28.9 % (ref 36.5–49.3)
HGB BLD-MCNC: 9.6 G/DL (ref 12–17)
LYMPHOCYTES # BLD AUTO: 1.26 THOUSANDS/ΜL (ref 0.6–4.47)
LYMPHOCYTES NFR BLD AUTO: 20 % (ref 14–44)
MAGNESIUM SERPL-MCNC: 1.3 MG/DL (ref 1.6–2.6)
MCH RBC QN AUTO: 31 PG (ref 26.8–34.3)
MCHC RBC AUTO-ENTMCNC: 33.2 G/DL (ref 31.4–37.4)
MCV RBC AUTO: 93 FL (ref 82–98)
MONOCYTES # BLD AUTO: 0.69 THOUSAND/ΜL (ref 0.17–1.22)
MONOCYTES NFR BLD AUTO: 11 % (ref 4–12)
NEUTROPHILS # BLD AUTO: 3.99 THOUSANDS/ΜL (ref 1.85–7.62)
NEUTS SEG NFR BLD AUTO: 64 % (ref 43–75)
NRBC BLD AUTO-RTO: 0 /100 WBCS
PLATELET # BLD AUTO: 160 THOUSANDS/UL (ref 149–390)
PMV BLD AUTO: 12.4 FL (ref 8.9–12.7)
POTASSIUM SERPL-SCNC: 4.2 MMOL/L (ref 3.5–5.3)
RBC # BLD AUTO: 3.1 MILLION/UL (ref 3.88–5.62)
SODIUM SERPL-SCNC: 140 MMOL/L (ref 136–145)
WBC # BLD AUTO: 6.26 THOUSAND/UL (ref 4.31–10.16)

## 2018-08-14 PROCEDURE — 99232 SBSQ HOSP IP/OBS MODERATE 35: CPT | Performed by: HOSPITALIST

## 2018-08-14 PROCEDURE — 80048 BASIC METABOLIC PNL TOTAL CA: CPT | Performed by: HOSPITALIST

## 2018-08-14 PROCEDURE — 85025 COMPLETE CBC W/AUTO DIFF WBC: CPT | Performed by: HOSPITALIST

## 2018-08-14 PROCEDURE — 76770 US EXAM ABDO BACK WALL COMP: CPT

## 2018-08-14 PROCEDURE — 83735 ASSAY OF MAGNESIUM: CPT | Performed by: HOSPITALIST

## 2018-08-14 RX ORDER — HYDRALAZINE HYDROCHLORIDE 20 MG/ML
5 INJECTION INTRAMUSCULAR; INTRAVENOUS EVERY 6 HOURS PRN
Status: DISCONTINUED | OUTPATIENT
Start: 2018-08-14 | End: 2018-08-17 | Stop reason: HOSPADM

## 2018-08-14 RX ORDER — AMLODIPINE BESYLATE 5 MG/1
5 TABLET ORAL 2 TIMES DAILY
Status: DISCONTINUED | OUTPATIENT
Start: 2018-08-14 | End: 2018-08-17 | Stop reason: HOSPADM

## 2018-08-14 RX ORDER — MAGNESIUM SULFATE HEPTAHYDRATE 40 MG/ML
2 INJECTION, SOLUTION INTRAVENOUS ONCE
Status: COMPLETED | OUTPATIENT
Start: 2018-08-14 | End: 2018-08-14

## 2018-08-14 RX ADMIN — AMLODIPINE BESYLATE 5 MG: 5 TABLET ORAL at 08:56

## 2018-08-14 RX ADMIN — SODIUM CHLORIDE 100 ML/HR: 0.9 INJECTION, SOLUTION INTRAVENOUS at 12:07

## 2018-08-14 RX ADMIN — DORZOLAMIDE HYDROCHLORIDE AND TIMOLOL MALEATE 1 DROP: 20; 5 SOLUTION/ DROPS OPHTHALMIC at 09:07

## 2018-08-14 RX ADMIN — ATORVASTATIN CALCIUM 40 MG: 40 TABLET, FILM COATED ORAL at 08:55

## 2018-08-14 RX ADMIN — HEPARIN SODIUM 5000 UNITS: 5000 INJECTION, SOLUTION INTRAVENOUS; SUBCUTANEOUS at 14:00

## 2018-08-14 RX ADMIN — FINASTERIDE 5 MG: 5 TABLET, FILM COATED ORAL at 08:56

## 2018-08-14 RX ADMIN — HEPARIN SODIUM 5000 UNITS: 5000 INJECTION, SOLUTION INTRAVENOUS; SUBCUTANEOUS at 06:14

## 2018-08-14 RX ADMIN — LATANOPROST 1 DROP: 50 SOLUTION OPHTHALMIC at 21:16

## 2018-08-14 RX ADMIN — PANTOPRAZOLE SODIUM 20 MG: 20 TABLET, DELAYED RELEASE ORAL at 06:14

## 2018-08-14 RX ADMIN — MINERAL OIL AND WHITE PETROLATUM: 30; 940 OINTMENT OPHTHALMIC at 21:16

## 2018-08-14 RX ADMIN — ASPIRIN 81 MG: 81 TABLET, COATED ORAL at 08:56

## 2018-08-14 RX ADMIN — HYDRALAZINE HYDROCHLORIDE 25 MG: 25 TABLET, FILM COATED ORAL at 21:25

## 2018-08-14 RX ADMIN — HEPARIN SODIUM 5000 UNITS: 5000 INJECTION, SOLUTION INTRAVENOUS; SUBCUTANEOUS at 21:17

## 2018-08-14 RX ADMIN — HYDRALAZINE HYDROCHLORIDE 25 MG: 25 TABLET, FILM COATED ORAL at 08:55

## 2018-08-14 RX ADMIN — MAGNESIUM SULFATE HEPTAHYDRATE 2 G: 40 INJECTION, SOLUTION INTRAVENOUS at 12:06

## 2018-08-14 RX ADMIN — HYDRALAZINE HYDROCHLORIDE 25 MG: 25 TABLET, FILM COATED ORAL at 12:06

## 2018-08-14 RX ADMIN — METOPROLOL SUCCINATE 25 MG: 25 TABLET, EXTENDED RELEASE ORAL at 08:56

## 2018-08-14 RX ADMIN — DORZOLAMIDE HYDROCHLORIDE AND TIMOLOL MALEATE 1 DROP: 20; 5 SOLUTION/ DROPS OPHTHALMIC at 17:50

## 2018-08-14 NOTE — ASSESSMENT & PLAN NOTE
· Likely secondary to dehydration  Still not back to baseline  · No evidence of infectious process  · Afebrile  · No leukocytosis  · UA and CXR are negative  · CT head negative  · Continue with gentle IV fluids

## 2018-08-14 NOTE — ASSESSMENT & PLAN NOTE
· BPs remain quite elevated  · Increased amlodipine to 5 mg BID yesterday  · Continue hydralazine 25 mg 4 times a day  · Continue metoprolol 25 mg daily    · PRN hydralazine for SBP >170

## 2018-08-14 NOTE — CASE MANAGEMENT
Initial Clinical Review    Admission: Date/Time/Statement: 8/13/18 @ 1632     Orders Placed This Encounter   Procedures    Inpatient Admission (expected length of stay for this patient is greater than two midnights)     Standing Status:   Standing     Number of Occurrences:   1     Order Specific Question:   Admitting Physician     Answer:   Zelalem Owens [[id number]]     Order Specific Question:   Level of Care     Answer:   Med Surg [16]     Order Specific Question:   Estimated length of stay     Answer:   More than 2 Midnights     Order Specific Question:   Certification     Answer:   I certify that inpatient services are medically necessary for this patient for a duration of greater than two midnights  See H&P and MD Progress Notes for additional information about the patient's course of treatment  ED: Date/Time/Mode of Arrival:   ED Arrival Information     Expected Arrival Acuity Means of Arrival Escorted By Service Admission Type    - 8/13/2018 13:21 Urgent Ambulance [address] [facility] Urgent    Arrival Complaint    Altered Mental Status         Chief Complaint:   Chief Complaint   Patient presents with    Altered Mental Status     pt present by EMS from [facility]  staff reports recent change in mental status today  EMS reports pt was found sleeping in other person room upon arrival  pt is normally alert and oriented  pt is disoriented to situation at this time  History of Illness: 79 YO male presents from [facility] for increasing confusion  On arrival to the ED pt is not able to give much Hx, he is somnolent, slow to respond  Per [facility] pt is usually mentally aware, over the last few days he has had increased episodes of confusion, hallucinations which staff is unable to describe  Pt denies pain but states he just does not feel well  He has had decreased PO intake, concern for possible dehydration   Per staff they were considering a hospice consultation but when pt stated he did not feel well this morning decided to send to the ED  Pt denies chest pain or shortness of breath at this time  States he does feel generally weak  Pt denies CP/SOB/F/C/N/V/D/C, no dysuria, burning on urination or blood in urine  ED Vital Signs:   ED Triage Vitals   Temperature Pulse Respirations Blood Pressure SpO2   08/13/18 1326 08/13/18 1326 08/13/18 1326 08/13/18 1326 08/13/18 1326   98 1 °F (36 7 °C) 65 18 (!) 197/98 96 %      Temp Source Heart Rate Source Patient Position - Orthostatic VS BP Location FiO2 (%)   08/13/18 1326 08/13/18 1326 08/13/18 1326 08/13/18 1326 --   Temporal Monitor Lying Left arm       Pain Score       08/13/18 1522       No Pain        Wt Readings from Last 1 Encounters:   08/13/18 74 kg (163 lb 2 3 oz)       Vital Signs (abnormal):   08/13/18 1738 -- 72 16  179/97 97 % -- KR   08/13/18 1645 -- 78 20  181/90 96 % -- ARC   08/13/18 1628 -- 68 20  188/92 96 % -- ARC   08/13/18 1600 -- 74 20  201/100 97 % -- ARC   08/13/18 1522 -- 61 20  184/105 98 % -- ARC   08/13/18 1430 -- 64 19  171/84 96 % -- JTP   08/13/18 1326 98 1 °F (36 7 °C) -- -- -- -- -- JSB   08/13/18 1326 -- 65 18  197/98          Abnormal Labs/Diagnostic Test Results:  H&H 10 4 / 31 3,   CO2  20,   BUN 54,  Cr 4 32,   eGFR 11,   Alk phos 202,   Alb 2 9,   H&H 10 4 / 31 3,           MAG 1 4  NT-proBNP    24,740,          TSH 6 198,            Trop 0 05  Venous gas :  pco2 35 8,   po2 46 1,  hco3 17 5,  o2 hg  80 4  Urine: >= 300 protein,  Trace blood,   occ bacteria  BC's pending  CXR: No acute cardiopulmonary disease;  Asbestos-related pleural disease    CT Head: No acute intracranial abnormality   Microangiopathic changes      ED Treatment:   Medication Administration from 08/13/2018 1321 to 08/13/2018 8867       Date/Time Order Dose Route Action Action by Comments     08/13/2018 1426 sodium chloride 0 9 % bolus 1,000 mL 1,000 mL Intravenous New Bag       08/13/2018 1628 hydrALAZINE (APRESOLINE) injection 5 mg 5 mg Intravenous Given            Past Medical/Surgical History: Active Ambulatory Problems     Diagnosis Date Noted    Metabolic encephalopathy 87/61/6068    CKD (chronic kidney disease) stage 3, GFR 30-59 ml/min 06/14/2016    Essential hypertension 06/16/2016    Solitary pulmonary nodule on lung CT 06/16/2016    BPH (benign prostatic hyperplasia) 06/16/2016    Hyperlipidemia 06/16/2016     Resolved Ambulatory Problems     Diagnosis Date Noted    Hypertensive emergency 06/14/2016    Hyponatremia 06/14/2016    Constipation 06/16/2016    Thrombocytopenia ([facility] [address] ) 06/18/2016     Past Medical History:   Diagnosis Date    [facility]     Hypercholesteremia     Hypertension     Renal cell carcinoma (HCC)        Admitting Diagnosis: Dehydration [E86 0]  Confusion [R41 0]  Altered mental status [R41 82]    Age/Sex: 80 y o  male    Assessment/Plan:   Metabolic encephalopathy   Assessment & Plan     Likely secondary to dehydration  Check CT head  panculture  Hold off on antibiotics for now as I do not have any source of infection  Gentle IV fluids       Acute kidney injury ([facility] )   Assessment & Plan     Likely due to dehydration  Gentle IV fluids  Check renal u/s   Follow Cr       CKD (chronic kidney disease) stage 3, GFR 30-59 ml/min   Assessment & Plan     Mildly worse due to dehydration  Check renal u/s   Anticipated Length of Stay:  Patient will be admitted on an Inpatient basis with an anticipated length of stay of greater than 2 midnights  Justification for Hospital Stay:  Patient is metabolic encephalopathy, acute kidney injury  He needs workup for this confusion as well as IV fluids    Anticipate his length of stay will be greater than 2 midnights      Admission Orders:  IP  Cardiac Step 1 Diet  US Kidneys/Bladder  CBC,  BMP,  Mag  Scheduled Meds:   Current Facility-Administered Medications:                  amLODIPine 5 mg Oral BID     artificial tear  Both Eyes HS     aspirin 81 mg Oral Daily atorvastatin 40 mg Oral Daily     dorzolamide-timolol 1 drop Both Eyes BID     finasteride 5 mg Oral Daily     heparin (porcine) 5,000 Units Subcutaneous Q8H [address]             hydrALAZINE 25 mg Oral 4x Daily             latanoprost 1 drop Right Eye HS     magnesium sulfate  8/14 2 g Intravenous Once     metoprolol succinate 25 mg Oral Daily             pantoprazole 20 mg Oral Early Morning               Continuous Infusions:   sodium chloride 100 mL/hr Last Rate: 100 mL/hr (08/14/18 1207)     PRN Meds:   acetaminophen    acetaminophen    hydrALAZINE    hyoscyamine    Ondansetron  Measure PVR's    8/14: 97 8 - 67 - 20   189/86  BUN 49,   Cr 4 14,  eGFR 11,     Mag 1 3,   H&H 9 6 / 73 1    Metabolic encephalopathy        · Likely secondary to dehydration  Still not back to baseline  Continue with gentle IV fluids  Hypertensive urgency        · BPs remain quite elevated  · Increase amlodipine to 5 mg BID  · Continue hydralazine 25 mg 4 times a day  · Continue metoprolol 25 mg daily  · PRN Hydralazine for SBP >170     Acute kidney injury ([facility] [address] )        · S/p L nephrectomy >25 years ago for RCC  · Baseline creatinine:  Last known creatinine was in the mid 2 0s in 2016 but hasn't seen the nephrologist since that time  ? Creatinine on admission:  4 32  ? Creatinine today:  4 14    · Suspect dehydration secondary to poor oral intake  ? Continue with gentle IV fluids for now  · Renal ultrasound pending  · Avoid hypotension and nephrotoxin agents        Hypomagnesemia        · Magnesium:  1 3  · Replete and monitor  Thank you,  Amara Suarez  [facility] Utilization Review Department  Phone: [phone]; Fax [phone]  ATTENTION: The Network Utilization Review Department is now centralized for our 9 Facilities  Make a note that we have a new phone and fax numbers for our Department   Please call with any questions or concerns to [phone] and carefully follow the prompts so that you are directed to the right person  All voicemails are confidential  Fax any determinations, approvals, denials, and requests for initial or continue stay review clinical to [phone]  Due to HIGH CALL volume, it would be easier if you could please send faxed requests to expedite your requests and in part, help us provide discharge notifications faster

## 2018-08-14 NOTE — ASSESSMENT & PLAN NOTE
· BPs remain quite elevated  · Increase amlodipine to 5 mg BID  · Continue hydralazine 25 mg 4 times a day  · Continue metoprolol 25 mg daily

## 2018-08-14 NOTE — ASSESSMENT & PLAN NOTE
· S/p L nephrectomy >25 years ago for RCC  · Baseline creatinine:  Last known creatinine was in the mid 2 0s in 2016 but hasn't seen the nephrologist since that time  · Creatinine on admission:  4 32  · Creatinine today:  4 14    · Suspect dehydration secondary to poor oral intake  · Continue with gentle IV fluids for now  · Renal ultrasound pending  · Avoid hypotension and nephrotoxin agents

## 2018-08-14 NOTE — PROGRESS NOTES
Progress Note - Mary Vázquez 6/14/1922, 80 y o  male MRN: [id number]  Unit/Bed#: [facility] [address] 220-01 Encounter: [id number]  Primary Care Provider: Cecilia Browning MD   Date and time admitted to hospital: 8/13/2018  8:58 PM    * Metabolic encephalopathy   Assessment & Plan    · Likely secondary to dehydration  Still not back to baseline  · No evidence of infectious process  · Afebrile  · No leukocytosis  · UA and CXR are negative  · CT head negative  · Continue with gentle IV fluids  Hypertensive urgency   Assessment & Plan    · BPs remain quite elevated  · Increase amlodipine to 5 mg BID  · Continue hydralazine 25 mg 4 times a day  · Continue metoprolol 25 mg daily  · PRN Hydralazine for SBP >170        Acute kidney injury [facility])   Assessment & Plan    · S/p L nephrectomy >25 years ago for RCC  · Baseline creatinine:  Last known creatinine was in the mid 2 0s in 2016 but hasn't seen the nephrologist since that time  · Creatinine on admission:  4 32  · Creatinine today:  4 14    · Suspect dehydration secondary to poor oral intake  · Continue with gentle IV fluids for now  · Renal ultrasound pending  · Avoid hypotension and nephrotoxin agents  Hypomagnesemia   Assessment & Plan    · Magnesium:  1 3  · Replete and monitor  BPH (benign prostatic hyperplasia)   Assessment & Plan    · Check post-void residuals  · Continue finasteride  CKD (chronic kidney disease) stage 3, GFR 30-59 ml/min   Assessment & Plan    · Unknown baseline  Last known was in mid 2 0s  VTE Pharmacologic Prophylaxis:   Pharmacologic: Heparin  Mechanical: Mechanical VTE prophylaxis in place  Patient Centered Rounds: I have performed bedside rounds with nursing staff today  Discussions with Specialists or Other Care Team Provider: None  Education and Discussions with Family / Patient: Called patient's granddaughter, Juana Pandey, to provide an update and obtain historical information    Time Spent for Care: 20 minutes  More than 50% of total time spent on counseling and coordination of care as described above  Current Length of Stay: 1 day(s)  Current Patient Status: Inpatient   Certification Statement: The patient will continue to require additional inpatient hospital stay due to hypertensive urgency and ANGE  Discharge Plan: Patient resides at [address] [place] [address] and will return when medically stable  Code Status: Level 1 - Full Code    Subjective:   Patient still appears confused although he was able to answer some of my questions correctly  He is complaining of some epigastric discomfort and nausea but denies any vomiting  Denies any shortness of breath or chest pain  Spoke with patient's granddaughter Trish Peña who provided full details of the patient's medical history  Patient has a history of nephrectomy  He was last seen by a nephrologist 2 years ago  He had been on hospice in the past but this was revoked after patient's health stabilized  Family has no interest in pursuing dialysis if this becomes necessary  Objective:   Vitals:   Temp (24hrs), Av 2 °F (36 8 °C), Min:97 8 °F (36 6 °C), Max:98 5 °F (36 9 °C)    HR:  [61-82] 67  Resp:  [16-20] 20  BP: (170-201)/() 189/86  SpO2:  [94 %-98 %] 95 %  Body mass index is 24 44 kg/m²  Input and Output Summary (last 24 hours): Intake/Output Summary (Last 24 hours) at 18 1006  Last data filed at 18 3081   Gross per 24 hour   Intake                0 ml   Output             1255 ml   Net            -1255 ml       Physical Exam:     Physical Exam   Constitutional: He is cooperative  HENT:   Head: Normocephalic and atraumatic  Mouth/Throat: Oropharynx is clear and moist and mucous membranes are normal    Eyes: No scleral icterus  Neck: No JVD present  Cardiovascular: Normal rate and regular rhythm  Murmur heard  Pulmonary/Chest: He has decreased breath sounds in the right lower field  He has no wheezes   He has no rales  He exhibits no tenderness  Abdominal: Soft  Bowel sounds are normal  He exhibits no distension  There is no tenderness  Musculoskeletal: Normal range of motion  He exhibits no edema  Neurological: He is alert  Confused at times  Skin: Skin is warm and dry  No rash noted  Psychiatric: He has a normal mood and affect  His speech is normal and behavior is normal  Cognition and memory are impaired  Vitals reviewed  Additional Data:   Labs:    Results from last 7 days  Lab Units 08/14/18  0447   WBC Thousand/uL 6 26   HEMOGLOBIN g/dL 9 6*   HEMATOCRIT % 28 9*   PLATELETS Thousands/uL 160   NEUTROS PCT % 64   LYMPHS PCT % 20   MONOS PCT % 11   EOS PCT % 5       Results from last 7 days  Lab Units 08/14/18  0447 08/13/18  1353   SODIUM mmol/L 140 136   POTASSIUM mmol/L 4 2 4 8   CHLORIDE mmol/L 108 105   CO2 mmol/L 19* 20*   BUN mg/dL 49* 54*   CREATININE mg/dL 4 14* 4 32*   CALCIUM mg/dL 8 6 9 1   TOTAL PROTEIN g/dL  --  6 7   BILIRUBIN TOTAL mg/dL  --  0 49   ALK PHOS U/L  --  202*   ALT U/L  --  22   AST U/L  --  31   GLUCOSE RANDOM mg/dL 87 95           * I Have Reviewed All Lab Data Listed Above  * Additional Pertinent Lab Tests Reviewed:  All Labs Within Last 24 Hours Reviewed    Imaging:  Imaging Reports Reviewed Today Include: Chest xray    Cultures:   Blood Culture: No results found for: BLOODCX  Urine Culture:   Lab Results   Component Value Date    URINECX <10,000 cfu/ml Mixed Contaminants X4 06/15/2016     Sputum Culture: No components found for: SPUTUMCX  Wound Culture: No results found for: WOUNDCULT    Last 24 Hours Medication List:     Current Facility-Administered Medications:  acetaminophen 650 mg Rectal Q4H PRN Butch Prechtel, DO    acetaminophen 325 mg Oral Q8H PRN Butch Prechtel, DO    amLODIPine 5 mg Oral Daily Butch Prechtel, DO    artificial tear  Both Eyes HS Butch Prechtel, DO    aspirin 81 mg Oral Daily Butch Prechtel, DO    atorvastatin 40 mg Oral Daily Fede López Prechtel, DO    dorzolamide-timolol 1 drop Both Eyes BID Butch Prechtel, DO    finasteride 5 mg Oral Daily Butch Prechtel, DO    heparin (porcine) 5,000 Units Subcutaneous Q8H [address] Butch Prechtel, DO    hydrALAZINE 25 mg Oral 4x Daily Butch Prechtel, DO    hyoscyamine 0 125 mg Oral Q4H PRN Butch Prechtel, DO    latanoprost 1 drop Right Eye HS Butch Prechtel, DO    metoprolol succinate 25 mg Oral Daily Butch Prechtel, DO    ondansetron 4 mg Intravenous Q6H PRN Butch Prechtel, DO    pantoprazole 20 mg Oral Early Morning Butch Prechtel, DO    sodium chloride 100 mL/hr Intravenous Continuous Butch Prechtel, DO Last Rate: 100 mL/hr (08/13/18 6653)        Today, Patient Was Seen By: Yovanny Zuniga PA-C    ** Please Note: Dragon 360 Dictation voice to text software may have been used in the creation of this document   **

## 2018-08-14 NOTE — CASE MANAGEMENT
Notification of Inpatient Admission/Inpatient Authorization Request  This is a Notification of Inpatient Admission/Request for Inpatient Authorization to our facility [address]  Please be advised that this patient is currently in our facility under Inpatient Status  Below you will find the Attending Physician and Facilitys information including NPI# and contact information for the Utilization  assigned to the [facility] where the patient is receiving services  Please feel free to contact the Utilization Review Department with any questions  Patient Information:  PATIENT NAME: Gloria Beckman  MRN: [id number]  YOB: 1922    PRESENTATION DATE: 8/13/2018  1:22 PM  IP ADMISSION DATE: 8/13/18 1632  DISCHARGE DATE: No discharge date for patient encounter  DISPOSITION: Home/Self Care    Attending Physician:  Clarissa Fontaine  NPI: [id number]  Facility:  [address] [place] [address] [facility]  [phone]  Tax ID: [id number]  NPI: [id number]    Thank you,  Amara Suarez  [facility] Utilization Review Department  Phone: [phone]; Fax [phone]  ATTENTION: The Network Utilization Review Department is now centralized for our 9 Facilities  Make a note that we have a new phone and fax numbers for our Department  Please call with any questions or concerns to [phone] and carefully follow the prompts so that you are directed to the right person  All voicemails are confidential  Fax any determinations, approvals, denials, and requests for initial or continue stay review clinical to [phone]  Due to HIGH CALL volume, it would be easier if you could please send faxed requests to expedite your requests and in part, help us provide discharge notifications faster

## 2018-08-14 NOTE — PLAN OF CARE
INFECTION - ADULT     Absence or prevention of progression during hospitalization Progressing        PAIN - ADULT     Verbalizes/displays adequate comfort level or baseline comfort level Progressing        Potential for Falls     Patient will remain free of falls Progressing        Prexisting or High Potential for Compromised Skin Integrity     Skin integrity is maintained or improved Progressing        SAFETY ADULT     Patient will remain free of falls Progressing     Maintain or return to baseline ADL function Progressing     Maintain or return mobility status to optimal level Progressing

## 2018-08-15 ENCOUNTER — APPOINTMENT (INPATIENT)
Dept: RADIOLOGY | Facility: HOSPITAL | Age: 83
DRG: 682 | End: 2018-08-15
Payer: COMMERCIAL

## 2018-08-15 PROBLEM — E83.42 HYPOMAGNESEMIA: Status: RESOLVED | Noted: 2018-08-14 | Resolved: 2018-08-15

## 2018-08-15 PROBLEM — E87.70 VOLUME OVERLOAD: Status: ACTIVE | Noted: 2018-08-15

## 2018-08-15 LAB
ANION GAP SERPL CALCULATED.3IONS-SCNC: 14 MMOL/L (ref 4–13)
ATRIAL RATE: 166 BPM
BUN SERPL-MCNC: 44 MG/DL (ref 5–25)
CALCIUM SERPL-MCNC: 8.5 MG/DL (ref 8.3–10.1)
CHLORIDE SERPL-SCNC: 109 MMOL/L (ref 100–108)
CO2 SERPL-SCNC: 19 MMOL/L (ref 21–32)
CREAT SERPL-MCNC: 3.94 MG/DL (ref 0.6–1.3)
GFR SERPL CREATININE-BSD FRML MDRD: 12 ML/MIN/1.73SQ M
GLUCOSE SERPL-MCNC: 82 MG/DL (ref 65–140)
MAGNESIUM SERPL-MCNC: 1.8 MG/DL (ref 1.6–2.6)
POTASSIUM SERPL-SCNC: 4.1 MMOL/L (ref 3.5–5.3)
QRS AXIS: 33 DEGREES
QRSD INTERVAL: 88 MS
QT INTERVAL: 398 MS
QTC INTERVAL: 435 MS
SODIUM SERPL-SCNC: 142 MMOL/L (ref 136–145)
T WAVE AXIS: 63 DEGREES
VENTRICULAR RATE: 72 BPM

## 2018-08-15 PROCEDURE — 93010 ELECTROCARDIOGRAM REPORT: CPT | Performed by: INTERNAL MEDICINE

## 2018-08-15 PROCEDURE — 99222 1ST HOSP IP/OBS MODERATE 55: CPT | Performed by: INTERNAL MEDICINE

## 2018-08-15 PROCEDURE — 99232 SBSQ HOSP IP/OBS MODERATE 35: CPT | Performed by: HOSPITALIST

## 2018-08-15 PROCEDURE — 71045 X-RAY EXAM CHEST 1 VIEW: CPT

## 2018-08-15 PROCEDURE — 83735 ASSAY OF MAGNESIUM: CPT | Performed by: PHYSICIAN ASSISTANT

## 2018-08-15 PROCEDURE — 80048 BASIC METABOLIC PNL TOTAL CA: CPT | Performed by: PHYSICIAN ASSISTANT

## 2018-08-15 RX ORDER — FUROSEMIDE 10 MG/ML
40 INJECTION INTRAMUSCULAR; INTRAVENOUS ONCE
Status: COMPLETED | OUTPATIENT
Start: 2018-08-15 | End: 2018-08-15

## 2018-08-15 RX ADMIN — HEPARIN SODIUM 5000 UNITS: 5000 INJECTION, SOLUTION INTRAVENOUS; SUBCUTANEOUS at 15:22

## 2018-08-15 RX ADMIN — METOPROLOL SUCCINATE 25 MG: 25 TABLET, EXTENDED RELEASE ORAL at 09:36

## 2018-08-15 RX ADMIN — DORZOLAMIDE HYDROCHLORIDE AND TIMOLOL MALEATE 1 DROP: 20; 5 SOLUTION/ DROPS OPHTHALMIC at 09:36

## 2018-08-15 RX ADMIN — PANTOPRAZOLE SODIUM 20 MG: 20 TABLET, DELAYED RELEASE ORAL at 06:13

## 2018-08-15 RX ADMIN — MINERAL OIL AND WHITE PETROLATUM: 30; 940 OINTMENT OPHTHALMIC at 22:00

## 2018-08-15 RX ADMIN — AMLODIPINE BESYLATE 5 MG: 5 TABLET ORAL at 18:18

## 2018-08-15 RX ADMIN — FINASTERIDE 5 MG: 5 TABLET, FILM COATED ORAL at 09:36

## 2018-08-15 RX ADMIN — AMLODIPINE BESYLATE 5 MG: 5 TABLET ORAL at 09:36

## 2018-08-15 RX ADMIN — HEPARIN SODIUM 5000 UNITS: 5000 INJECTION, SOLUTION INTRAVENOUS; SUBCUTANEOUS at 22:01

## 2018-08-15 RX ADMIN — HYDRALAZINE HYDROCHLORIDE 25 MG: 25 TABLET, FILM COATED ORAL at 09:36

## 2018-08-15 RX ADMIN — HYDRALAZINE HYDROCHLORIDE 5 MG: 20 INJECTION INTRAMUSCULAR; INTRAVENOUS at 01:39

## 2018-08-15 RX ADMIN — ATORVASTATIN CALCIUM 40 MG: 40 TABLET, FILM COATED ORAL at 09:36

## 2018-08-15 RX ADMIN — HYDRALAZINE HYDROCHLORIDE 25 MG: 25 TABLET, FILM COATED ORAL at 12:02

## 2018-08-15 RX ADMIN — FUROSEMIDE 40 MG: 10 INJECTION, SOLUTION INTRAMUSCULAR; INTRAVENOUS at 12:02

## 2018-08-15 RX ADMIN — LATANOPROST 1 DROP: 50 SOLUTION OPHTHALMIC at 22:00

## 2018-08-15 RX ADMIN — HYDRALAZINE HYDROCHLORIDE 25 MG: 25 TABLET, FILM COATED ORAL at 22:00

## 2018-08-15 RX ADMIN — DORZOLAMIDE HYDROCHLORIDE AND TIMOLOL MALEATE 1 DROP: 20; 5 SOLUTION/ DROPS OPHTHALMIC at 18:19

## 2018-08-15 RX ADMIN — HEPARIN SODIUM 5000 UNITS: 5000 INJECTION, SOLUTION INTRAVENOUS; SUBCUTANEOUS at 06:13

## 2018-08-15 RX ADMIN — ASPIRIN 81 MG: 81 TABLET, COATED ORAL at 09:36

## 2018-08-15 RX ADMIN — HYDRALAZINE HYDROCHLORIDE 25 MG: 25 TABLET, FILM COATED ORAL at 18:18

## 2018-08-15 NOTE — SOCIAL WORK
CM called daughter, Brenda Graff, due to patient not being completely alert & oriented  Patient lives in an assisted living facility, [facility]  Patient has difficulty with steps  Patient receives assistance with ADLs and functional mobility  Meals provided by [employer]  Patient uses a RW for ambulation purposes  Patient is able to ambulate without assistance from a seated or laying position  Hx of VNA reported  There is no agency involvement out in the community  Patient is retired  PCP identified as Dr Mariaa Pelayo  POA identified as daughter, as well as, spokesperson for the family  [facility] are designated caregiver if/when needed  [facility] supplies patients medications  Patient does not drive; daughter reports she is available at discharge to transport home  He may not qualify for BLS as dementia is not documented, and daughter is worried about costs if patient does become responsible for bill and she prefers to avoid the cost of a WCV  This is not a readmission; patient has not been hospitalized since 2016  There were no needs or concerns that daughter desired to have addressed while patient was here  No discharge needs present at this time  Daughter will transport back to [facility]  CM will remain available and follow if needed

## 2018-08-15 NOTE — ASSESSMENT & PLAN NOTE
· Likely secondary to dehydration  Still not back to baseline  · No evidence of infectious process  · Afebrile  · No leukocytosis on admission  · UA and CXR on admission are negative  · CT head negative  · IVFs discontinued given volume status

## 2018-08-15 NOTE — CONSULTS
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  Unsure of recent baseline is the 1 we have an records was from 2 years ago  It is possible the patient has had progression over that period of time there was an acute injury  Overnight the patient receive some IV fluids creatinine was slightly lower  At this point there is some opinion that he is volume overloaded and diuretics were given in volume is held  At this point we need to obtain the baseline creatinine for the patient so we can see where we can expect his renal function to reach  Monitor after dose of diuretic  BMP a m  History of Present Illness   Physician Requesting Consult: Chelsea Partida DO  Reason for Consult / Principal Problem:  Renal failure  Hx and PE limited by:   HPI: Bret Mo is a 80y o  year old male who presented to the hospital with confusion from his care facility  Apparently the patient was having hallucinations confusion was walking in an out of the rooms of other patients and acting confused  History obtained from chart review and the patient  Consults    Review of Systems  Unable to get accurate review of system from patient    Historical Information   Patient Active Problem List   Diagnosis    Metabolic encephalopathy    CKD (chronic kidney disease) stage 3, GFR 30-59 ml/min    Essential hypertension    Solitary pulmonary nodule on lung CT    BPH (benign prostatic hyperplasia)    Hyperlipidemia    Acute kidney injury ([address] )    Hypertensive urgency    Volume overload     Past Medical History:   Diagnosis Date    Asbestosis ([address] )     Hypercholesteremia     Hypertension     Renal cell carcinoma ([address] )      Past Surgical History:   Procedure Laterality Date    CARDIAC SURGERY      CABG x4    EYE SURGERY  NEPHRECTOMY       Social History   History   Alcohol Use No     History   Drug Use No     History   Smoking Status    Never Smoker   Smokeless Tobacco    Not on file     Family History   Problem Relation Age of Onset    Family history unknown: Yes       Meds/Allergies   current meds:   Current Facility-Administered Medications   Medication Dose Route Frequency    acetaminophen (TYLENOL) rectal suppository 650 mg  650 mg Rectal Q4H PRN    acetaminophen (TYLENOL) tablet 325 mg  325 mg Oral Q8H PRN    amLODIPine (NORVASC) tablet 5 mg  5 mg Oral BID    artificial tear (LUBRIFRESH P M ) ophthalmic ointment   Both Eyes HS    aspirin (ECOTRIN LOW STRENGTH) EC tablet 81 mg  81 mg Oral Daily    atorvastatin (LIPITOR) tablet 40 mg  40 mg Oral Daily    dorzolamide-timolol (COSOPT) 22 3-6 8 MG/ML ophthalmic solution 1 drop  1 drop Both Eyes BID    finasteride (PROSCAR) tablet 5 mg  5 mg Oral Daily    heparin (porcine) subcutaneous injection 5,000 Units  5,000 Units Subcutaneous Q8H [address]    hydrALAZINE (APRESOLINE) injection 5 mg  5 mg Intravenous Q6H PRN    hydrALAZINE (APRESOLINE) tablet 25 mg  25 mg Oral 4x Daily    hyoscyamine (LEVSIN/SL) SL tablet 0 125 mg  0 125 mg Oral Q4H PRN    latanoprost (XALATAN) 0 005 % ophthalmic solution 1 drop  1 drop Right Eye HS    metoprolol succinate (TOPROL-XL) 24 hr tablet 25 mg  25 mg Oral Daily    ondansetron (ZOFRAN) injection 4 mg  4 mg Intravenous Q6H PRN    pantoprazole (PROTONIX) EC tablet 20 mg  20 mg Oral Early Morning     No Known Allergies    Objective     Intake/Output Summary (Last 24 hours) at 08/15/18 1436  Last data filed at 08/15/18 0808   Gross per 24 hour   Intake          3916 67 ml   Output              378 ml   Net          3538 67 ml     Body mass index is 24 44 kg/m²      Invasive Devices:        PHYSICAL EXAM:  BP (!) 179/97 (BP Location: Left arm)   Pulse (!) 50   Temp 97 6 °F (36 4 °C) (Temporal)   Resp 20   Wt 74 kg (163 lb 2 3 oz) SpO2 93%   BMI 24 44 kg/m²     Physical Exam   Constitutional: No distress  Neck: No JVD present  Cardiovascular: Normal rate  Exam reveals no friction rub  Positive edema  Pulmonary/Chest:   Positive rhonchi with crackles at bases   Abdominal: Soft  He exhibits no distension  There is no tenderness  Current Weight: Weight - Scale: 74 kg (163 lb 2 3 oz)  First Weight: Weight - Scale: 74 kg (163 lb 2 3 oz)    Lab Results:      Results from last 7 days  Lab Units 08/14/18  0447   WBC Thousand/uL 6 26   HEMOGLOBIN g/dL 9 6*   HEMATOCRIT % 28 9*   PLATELETS Thousands/uL 160       Results from last 7 days  Lab Units 08/15/18  0444   SODIUM mmol/L 142   POTASSIUM mmol/L 4 1   CHLORIDE mmol/L 109*   CO2 mmol/L 19*   BUN mg/dL 44*   CREATININE mg/dL 3 94*   GLUCOSE RANDOM mg/dL 82   CALCIUM mg/dL 8 5       Results from last 7 days  Lab Units 08/15/18  0444  08/13/18  1353   SODIUM mmol/L 142  < > 136   POTASSIUM mmol/L 4 1  < > 4 8   CHLORIDE mmol/L 109*  < > 105   CO2 mmol/L 19*  < > 20*   BUN mg/dL 44*  < > 54*   CREATININE mg/dL 3 94*  < > 4 32*   CALCIUM mg/dL 8 5  < > 9 1   TOTAL PROTEIN g/dL  --   --  6 7   BILIRUBIN TOTAL mg/dL  --   --  0 49   ALK PHOS U/L  --   --  202*   ALT U/L  --   --  22   AST U/L  --   --  31   GLUCOSE RANDOM mg/dL 82  < > 95   < > = values in this interval not displayed

## 2018-08-15 NOTE — PLAN OF CARE
GENITOURINARY - ADULT     Maintains or returns to baseline urinary function Progressing        INFECTION - ADULT     Absence or prevention of progression during hospitalization Progressing        METABOLIC, FLUID AND ELECTROLYTES - ADULT     Electrolytes maintained within normal limits Progressing     Fluid balance maintained Progressing        MUSCULOSKELETAL - ADULT     Maintain or return mobility to safest level of function Progressing        PAIN - ADULT     Verbalizes/displays adequate comfort level or baseline comfort level Progressing        Potential for Falls     Patient will remain free of falls Progressing        Prexisting or High Potential for Compromised Skin Integrity     Skin integrity is maintained or improved Progressing        SAFETY ADULT     Patient will remain free of falls Progressing     Maintain or return to baseline ADL function Progressing     Maintain or return mobility status to optimal level Progressing        SKIN/TISSUE INTEGRITY - ADULT     Skin integrity remains intact Progressing     Incision(s), wounds(s) or drain site(s) healing without S/S of infection Progressing

## 2018-08-15 NOTE — CONSULTS
Cardiology Consult  Rockland Harada  [phone]    08/15/18    Referring Physian: DO ABIDA Castillo    Chief Complain/Reason for Referal:     IMPRESSION:    1  Admitted with confusion  2  Renal insufficiency-unclear recent baseline  3  Hypertension-appears not controlled  4  BPH  5  Renal cancer status post nephrectomy    DISCUSSION/RECOMMENDATIONS:    Unclear how much volume overload he has  Examination is without jugular venous distension or pulmonary rales  His pulse oximetry is approximately 92-93% on room air which is a bit lower than it was earlier in the admission  Received 1 time dose of Lasix  Would assess clinical response  Supportive care as you are     ======================================================    HPI:  I am seeing this patient in cardiology consultation for: volume overload     Rebecca Peña is a 80 y o  with past medical history of hypertension, renal cell cancer s/p nephrectomy, BPH, CRI presents with confusion and hallucinations  He was hypertensive on presentation which continues  Creatinine is 4, last known baseline is in the 2's  IVF were started on presentation now there is concern for volume overload for which cardiology is consulted  [place] is [address] troponin is 0 05  He appears to be a poor historian secondary to his confusion but denies any chest pain, shortness of breath, palpitations, dizziness, or syncope to me      Past Medical History:   Diagnosis Date    [address] [address]     Hypercholesteremia     Hypertension     Renal cell carcinoma (HCC)          Scheduled Meds:  Current Facility-Administered Medications:  acetaminophen 650 mg Rectal Q4H PRN Butch Prechtel, DO   acetaminophen 325 mg Oral Q8H PRN Butch Prechtel, DO   amLODIPine 5 mg Oral BID Commtimize, PA-C   artificial tear  Both Eyes HS Butch Prechtel, DO   aspirin 81 mg Oral Daily Butch Prechtel, DO   atorvastatin 40 mg Oral Daily Butch Prechtel, DO   dorzolamide-timolol 1 drop Both Eyes BID Butch Prechtel, DO   finasteride 5 mg Oral Daily Butch Prechtel, DO   heparin (porcine) 5,000 Units Subcutaneous [facility] Butch Prechtel, DO   hydrALAZINE 5 mg Intravenous Q6H PRN Charolett Masker, PA-C   hydrALAZINE 25 mg Oral 4x Daily Butch Prechtel, DO   hyoscyamine 0 125 mg Oral Q4H PRN Butch Prechtel, DO   latanoprost 1 drop Right Eye HS Butch Prechtel, DO   metoprolol succinate 25 mg Oral Daily Butch Prechtel, DO   ondansetron 4 mg Intravenous Q6H PRN Butch Prechtel, DO   pantoprazole 20 mg Oral Early Morning Butch Prechtel, DO     Continuous Infusions:   PRN Meds:   acetaminophen    acetaminophen    hydrALAZINE    hyoscyamine    ondansetron  No Known Allergies  I reviewed the Home Medication list in the chart  Family History   Problem Relation Age of Onset    Family history unknown: Yes       Social History     Social History    Marital status:      Spouse name: N/A    Number of children: N/A    Years of education: N/A     Occupational History    Not on file       Social History Main Topics    Smoking status: Never Smoker    Smokeless tobacco: Not on file    Alcohol use No    Drug use: No    Sexual activity: Not on file     Other Topics Concern    Not on file     Social History Narrative    No narrative on file       Review of Systems - as per HPI, all others reviewed and negative  Vitals:    08/15/18 1202   BP: (!) 179/97   Pulse: (!) 50   Resp: 20   Temp:    SpO2: 93%     I/O       08/13 0701 - 08/14 0700 08/14 0701 - 08/15 0700 08/15 0701 - 08/16 0700    I V  (mL/kg)   3796 7 (51 3)    Total Intake(mL/kg)   3796 7 (51 3)    Urine (mL/kg/hr) 1255 703 (0 4)     Total Output 1255 703      Net -1255 -703 +3796 7               Weight (last 2 days)     Date/Time   Weight    08/13/18 1326  74 (163 14)              GEN: NAD, Alert  HEENT: Mucus membranes moist, pink conjunctivae  EYES: Pupils equal, sclera anicteric  NECK: No JVD/HJR, no carotid bruit  CARDIOVASCULAR: RRR, No murmur, rub, gallops S1,S2, no parasternal heave/thrill  LUNGS: Clear To auscultation bilaterally  ABDOMEN: Soft, nondistended, no hepatic systolic pulsation  EXTREMITIES/VASCULAR: No edema  PSYCH: Normal Affect by limited examination  NEURO: Grossly intact by limited examination    HEME: No significant bleeding, bruising, petechia by limited examination  SKIN: No significant rashes by limited examination  MSK:  Normal upper extremity and trunk strengths by limited examination      EKG:sinus rhythm with premature ventricular complexes  CXR: no acute cardiopulmonary disease   ECHO:  Pending       Results from last 7 days  Lab Units 08/14/18  0447 08/13/18  1833 08/13/18  1353   WBC Thousand/uL 6 26  --  6 26   HEMOGLOBIN g/dL 9 6*  --  10 4*   HEMATOCRIT % 28 9*  --  31 3*   PLATELETS Thousands/uL 160 183 183   NEUTROS PCT % 64  --  61   MONOS PCT % 11  --  11       Results from last 7 days  Lab Units 08/15/18  0444 08/14/18  0447 08/13/18  1353   SODIUM mmol/L 142 140 136   POTASSIUM mmol/L 4 1 4 2 4 8   CHLORIDE mmol/L 109* 108 105   CO2 mmol/L 19* 19* 20*   BUN mg/dL 44* 49* 54*   CREATININE mg/dL 3 94* 4 14* 4 32*   GLUCOSE RANDOM mg/dL 82 87 95   CALCIUM mg/dL 8 5 8 6 9 1       Results from last 7 days  Lab Units 08/15/18  0444 08/14/18  0447 08/13/18  1353   SODIUM mmol/L 142 140 136   POTASSIUM mmol/L 4 1 4 2 4 8   CHLORIDE mmol/L 109* 108 105   CO2 mmol/L 19* 19* 20*   BUN mg/dL 44* 49* 54*   CREATININE mg/dL 3 94* 4 14* 4 32*   CALCIUM mg/dL 8 5 8 6 9 1   TOTAL PROTEIN g/dL  --   --  6 7   BILIRUBIN TOTAL mg/dL  --   --  0 49   ALK PHOS U/L  --   --  202*   ALT U/L  --   --  22   AST U/L  --   --  31   GLUCOSE RANDOM mg/dL 82 87 95     No results found for: TROPONINT        Results from last 7 days  Lab Units 08/13/18  1353   TROPONIN I ng/mL 0 05*                       I have personally reviewed the EKG, CXR and Telemetry images directly        Patient Active Problem List Diagnosis Date Noted    Volume overload 08/15/2018     Priority: Low    Hypertensive urgency 08/14/2018     Priority: Low    Acute kidney injury ([facility] [address] ) 08/13/2018     Priority: Low    Essential hypertension 06/16/2016     Priority: Low    Solitary pulmonary nodule on lung CT 06/16/2016     Priority: Low    BPH (benign prostatic hyperplasia) 06/16/2016     Priority: Low    Hyperlipidemia 06/16/2016     Priority: Low    Metabolic encephalopathy 19/41/3149     Priority: Low    CKD (chronic kidney disease) stage 3, GFR 30-59 ml/min 06/14/2016     Priority: Low       Portions of the record may have been created with voice recognition software  Occasional wrong word or "sound a like" substitutions may have occurred due to the inherent limitations of voice recognition software  Read the chart carefully and recognize, using context, where substitutions have occurred        Yanni Yepez MD  8/15/2018 12:43 PM

## 2018-08-15 NOTE — ASSESSMENT & PLAN NOTE
· S/p L nephrectomy >25 years ago for RCC  · Baseline creatinine:  Last known creatinine was in the mid 2 0s in 2016 but hasn't seen the nephrologist since that time  · Creatinine on admission:  4 32  · Creatinine today:  3 94    · Suspect dehydration secondary to poor oral intake  · IVFs discontinued given volume overload  · Renal ultrasound: Left nephrectomy  Right-sided medical renal disease  No hydronephrosis  · Avoid hypotension and nephrotoxin agents    · Consult renal

## 2018-08-15 NOTE — PLAN OF CARE
Problem: DISCHARGE PLANNING - CARE MANAGEMENT  Goal: Discharge to post-acute care or home with appropriate resources  INTERVENTIONS:  - Conduct assessment to determine patient/family and health care team treatment goals, and need for post-acute services based on payer coverage, community resources, and patient preferences, and barriers to discharge  - Address psychosocial, clinical, and financial barriers to discharge as identified in assessment in conjunction with the patient/family and health care team  - Arrange appropriate level of post-acute services according to patients   needs and preference and payer coverage in collaboration with the physician and health care team  - Communicate with and update the patient/family, physician, and health care team regarding progress on the discharge plan  - Arrange appropriate transportation to post-acute venues  Outcome: Adequate for Discharge  Daughter will be transporting back to [facility]  No further CM needs at this time

## 2018-08-15 NOTE — CONSULTS
Consult Note - Wound   Bhaskar Acuña 80 y o  male MRN: [id number]  Unit/Bed#: [facility] 2 -01 Encounter: [id number]      History and Present Illness:  80year old male presented to the hospital from [facility] with increased confusion  Assessment Findings:   Patient is lethargic, currently requiring complete assistance for all ADLs  He is incontinent of urine  Not eating well and more lethargic today per nursing team   Skin folds and heels intact  Patient has blanchable erythema to right shoulder--patient favors lying on right side in fetal position despite efforts to turn  1   Present on admission deep tissue injury to right hip--skin is dry and rough to touch  Non-blanchable erythema with deep purple maroon center  Wound originally documented greater than 24 hours after admission  However, RN Lani Finley states she noted the purple area to the patient's right hip on 8/14/2018 while helping with AM assessment indicating that the wound was present on admission  Plan:   1-Hydraguard to bilateral sacrum, buttock and heels BID and PRN  2-Elevate heels to offload pressure  3-Soft care cushion when out of bed  4-Moisturize skin daily with skin nourishing cream   5-Turn/reposition q2h or when medically stable for pressure re-distribution on skin (use turning wedges)  6-Cleanse right hip and right shoulder with soap and water, pat dry  Apply 3m Cavilon no sting skin barrier film to intact skin around right hip DTI  Cover with Allevyn foam (fernando with T)  Also place Allevyn foam to right shoulder for prevention (fernando with P)  Change dressings every 3 days and PRN  7-Accumax alternating pump to mattress  8-Wound care team to follow  Plan of care reviewed with primary RN, Greg Barrera  Discussed present on admission wound with ABIDA Mcgraw  Vitals: Blood pressure 163/68, pulse 76, temperature 97 6 °F (36 4 °C), temperature source Temporal, resp   rate 20, weight 74 kg (163 lb 2 3 oz), SpO2 92 %  ,Body mass index is 24 44 kg/m²  Pressure Ulcer 08/14/18 Hip Right Deep tissue injury (Active)   Staging Deep Tissue Injury 8/15/2018 11:00 AM   Wound Description Clean;Light purple;Non-blanchable erythema 8/15/2018 11:00 AM   Evelyn-wound Assessment Clean;Dry; Intact 8/15/2018 11:00 AM   Shape Circular 8/15/2018 11:00 AM   Wound Length (cm) 3 cm 8/15/2018 11:00 AM   Wound Width (cm) 4 5 cm 8/15/2018 11:00 AM   Wound Depth (cm) 0 8/15/2018 11:00 AM   Calculated Wound Area (cm^2) 13 5 cm^2 8/15/2018 11:00 AM   Calculated Wound Volume (cm^3) 0 cm^3 8/15/2018 11:00 AM   Drainage Amount None 8/15/2018 11:00 AM   Treatment Cleansed; Offload; Turn & reposition 8/15/2018 11:00 AM   Dressing Foam, Silicone (eg  Allevyn, etc) 8/15/2018 11:00 AM   Dressing Changed Changed 8/15/2018 11:00 AM   Patient Tolerance Tolerated well 8/15/2018 11:00 AM   Dressing Status Clean; Intact;Dry 8/15/2018 11:00 AM     Anastasiia JCN, RN, Gloria Elam

## 2018-08-15 NOTE — ASSESSMENT & PLAN NOTE
· Patient appears with volume overload s/p IVFs  · Will give 1 time dose of Lasix 40 mg now  · Check CXR and ECHO  · Low salt diet, daily weights and I&Os  · Consider cardiology evaluation

## 2018-08-15 NOTE — PROGRESS NOTES
Progress Note - Marcia Alvarez 6/14/1922, 80 y o  male MRN: [id number]  Unit/Bed#: [address] 220-01 Encounter: [id number]  Primary Care Provider: Anil Spears MD   Date and time admitted to hospital: 8/13/2018  4:82 PM    * Metabolic encephalopathy   Assessment & Plan    · Likely secondary to dehydration  Still not back to baseline  · No evidence of infectious process  · Afebrile  · No leukocytosis on admission  · UA and CXR on admission are negative  · CT head negative  · IVFs discontinued given volume status  Hypertensive urgency   Assessment & Plan    · BPs remain quite elevated  · Increased amlodipine to 5 mg BID yesterday  · Continue hydralazine 25 mg 4 times a day  · Continue metoprolol 25 mg daily  · PRN hydralazine for SBP >170  · S/p IV Lasix  Monitor BPs  Acute kidney injury [facility])   Assessment & Plan    · S/p L nephrectomy >25 years ago for RCC  · Baseline creatinine:  Last known creatinine was in the mid 2 0s in 2016 but hasn't seen the nephrologist since that time  · Creatinine on admission:  4 32  · Creatinine today:  3 94    · Suspect dehydration secondary to poor oral intake  · IVFs discontinued given volume overload  · Renal ultrasound: Left nephrectomy  Right-sided medical renal disease  No hydronephrosis  · Avoid hypotension and nephrotoxin agents  · Consult renal         Volume overload   Assessment & Plan    · Patient appears with volume overload s/p IVFs  · Will give 1 time dose of Lasix 40 mg now  · Check CXR and ECHO  · Low salt diet, daily weights and I&Os  · [address] cardiology  BPH (benign prostatic hyperplasia)   Assessment & Plan    · Check post-void residuals  · Continue finasteride  CKD (chronic kidney disease) stage 3, GFR 30-59 ml/min   Assessment & Plan    · Unknown baseline  Last known was in mid 2 0s  Hypomagnesemia-resolved as of 8/15/2018   Assessment & Plan    · Resolved s/p replacement    · Magnesium:  1 8          VTE Pharmacologic Prophylaxis:   Pharmacologic: Heparin  Mechanical: Mechanical VTE prophylaxis in place  Patient Centered Rounds: I have performed bedside rounds with nursing staff today  Discussions with Specialists or Other Care Team Provider: None  Education and Discussions with Family / Patient: Called patient's daughter with an update  Time Spent for Care: 20 minutes  More than 50% of total time spent on counseling and coordination of care as described above  Current Length of Stay: 2 day(s)  Current Patient Status: Inpatient   Certification Statement: The patient will continue to require additional inpatient hospital stay due to volume overload and ANGE    Discharge Plan: Patient is not stable for discharge  Code Status: Level 1 - Full Code    Subjective:   Patient is more lethargic today and now appears to be volume overloaded  Patient is arousable and able to answer my questions  He reports having some mild SOB  He denies any pain complaints  Objective:   Vitals:   Temp (24hrs), Av 9 °F (36 6 °C), Min:97 6 °F (36 4 °C), Max:98 1 °F (36 7 °C)    HR:  [50-76] 50  Resp:  [18-20] 20  BP: (108-186)/(68-98) 179/97  SpO2:  [91 %-98 %] 93 %  Body mass index is 24 44 kg/m²  Input and Output Summary (last 24 hours): Intake/Output Summary (Last 24 hours) at 08/15/18 1223  Last data filed at 08/15/18 7972   Gross per 24 hour   Intake          3796 67 ml   Output              378 ml   Net          3418 67 ml       Physical Exam:     Physical Exam   Constitutional: He appears lethargic  He is easily aroused  HENT:   Head: Normocephalic and atraumatic  Mouth/Throat: Oropharynx is clear and moist and mucous membranes are normal    Eyes: No scleral icterus  Cardiovascular: Normal rate and regular rhythm  No murmur heard  Distant heart sounds  Pulmonary/Chest: He has no wheezes  He has rhonchi  He has rales  He exhibits no tenderness  Abdominal: Soft   Bowel sounds are normal  He exhibits no distension  There is no tenderness  Musculoskeletal: Normal range of motion  He exhibits edema  Neurological: He is easily aroused  He appears lethargic  Skin: Skin is warm and dry  No rash noted  Psychiatric: He has a normal mood and affect  Cognition and memory are impaired  Vitals reviewed  Additional Data:   Labs:    Results from last 7 days  Lab Units 08/14/18  0447   WBC Thousand/uL 6 26   HEMOGLOBIN g/dL 9 6*   HEMATOCRIT % 28 9*   PLATELETS Thousands/uL 160   NEUTROS PCT % 64   LYMPHS PCT % 20   MONOS PCT % 11   EOS PCT % 5       Results from last 7 days  Lab Units 08/15/18  0444  08/13/18  1353   SODIUM mmol/L 142  < > 136   POTASSIUM mmol/L 4 1  < > 4 8   CHLORIDE mmol/L 109*  < > 105   CO2 mmol/L 19*  < > 20*   BUN mg/dL 44*  < > 54*   CREATININE mg/dL 3 94*  < > 4 32*   CALCIUM mg/dL 8 5  < > 9 1   TOTAL PROTEIN g/dL  --   --  6 7   BILIRUBIN TOTAL mg/dL  --   --  0 49   ALK PHOS U/L  --   --  202*   ALT U/L  --   --  22   AST U/L  --   --  31   GLUCOSE RANDOM mg/dL 82  < > 95   < > = values in this interval not displayed  * I Have Reviewed All Lab Data Listed Above  * Additional Pertinent Lab Tests Reviewed:  All Labs Within Last 24 Hours Reviewed    Imaging:    Imaging Reports Reviewed Today Include: Renal ultrasound    Cultures:   Blood Culture:   Lab Results   Component Value Date    BLOODCX No Growth at 24 hrs  08/13/2018    BLOODCX No Growth at 24 hrs  08/13/2018    BLOODCX No Growth at 24 hrs  08/13/2018    BLOODCX No Growth at 24 hrs  08/13/2018     Urine Culture:   Lab Results   Component Value Date    URINECX <10,000 cfu/ml Mixed Contaminants X4 06/15/2016     Sputum Culture: No components found for: SPUTUMCX  Wound Culture: No results found for: WOUNDCULT    Last 24 Hours Medication List:     Current Facility-Administered Medications:  acetaminophen 650 mg Rectal Q4H PRN Butch Prechtel, DO    acetaminophen 325 mg Oral Q8H PRN Butch Prechtel, DO    amLODIPine 5 mg Oral BID Gregor Cvoarrubias PA-C    artificial tear  Both Eyes HS Butch Prechtel, DO    aspirin 81 mg Oral Daily Butch Prechtel, DO    atorvastatin 40 mg Oral Daily Butch Prechtel, DO    dorzolamide-timolol 1 drop Both Eyes BID Butch Prechtel, DO    finasteride 5 mg Oral Daily Butch Prechtel, DO    heparin (porcine) 5,000 Units Subcutaneous Q8H [facility] Butch Prechtel, DO    hydrALAZINE 5 mg Intravenous Q6H PRN Gregor Covarrubias PA-C    hydrALAZINE 25 mg Oral 4x Daily Butch Prechtel, DO    hyoscyamine 0 125 mg Oral Q4H PRN Butch Prechtel, DO    latanoprost 1 drop Right Eye HS Butch Prechtel, DO    metoprolol succinate 25 mg Oral Daily Butch Prechtel, DO    ondansetron 4 mg Intravenous Q6H PRN Butch Prechtel, DO    pantoprazole 20 mg Oral Early Morning Butch Prechtel, DO    sodium chloride 100 mL/hr Intravenous Continuous Butch Prechtel, DO Last Rate: Stopped (08/15/18 8764)        Today, Patient Was Seen By: Gregor Covarrubias PA-C    ** Please Note: Dragon 360 Dictation voice to text software may have been used in the creation of this document   **

## 2018-08-16 ENCOUNTER — APPOINTMENT (INPATIENT)
Dept: NON INVASIVE DIAGNOSTICS | Facility: HOSPITAL | Age: 83
DRG: 682 | End: 2018-08-16
Payer: COMMERCIAL

## 2018-08-16 LAB
ANION GAP SERPL CALCULATED.3IONS-SCNC: 8 MMOL/L (ref 4–13)
BUN SERPL-MCNC: 47 MG/DL (ref 5–25)
CALCIUM SERPL-MCNC: 8.5 MG/DL (ref 8.3–10.1)
CHLORIDE SERPL-SCNC: 111 MMOL/L (ref 100–108)
CO2 SERPL-SCNC: 22 MMOL/L (ref 21–32)
CREAT SERPL-MCNC: 4.18 MG/DL (ref 0.6–1.3)
GFR SERPL CREATININE-BSD FRML MDRD: 11 ML/MIN/1.73SQ M
GLUCOSE SERPL-MCNC: 89 MG/DL (ref 65–140)
POTASSIUM SERPL-SCNC: 4.2 MMOL/L (ref 3.5–5.3)
SODIUM SERPL-SCNC: 141 MMOL/L (ref 136–145)

## 2018-08-16 PROCEDURE — 93306 TTE W/DOPPLER COMPLETE: CPT

## 2018-08-16 PROCEDURE — 99232 SBSQ HOSP IP/OBS MODERATE 35: CPT | Performed by: HOSPITALIST

## 2018-08-16 PROCEDURE — 99232 SBSQ HOSP IP/OBS MODERATE 35: CPT | Performed by: INTERNAL MEDICINE

## 2018-08-16 PROCEDURE — 80048 BASIC METABOLIC PNL TOTAL CA: CPT | Performed by: HOSPITALIST

## 2018-08-16 RX ORDER — SODIUM CHLORIDE 9 MG/ML
60 INJECTION, SOLUTION INTRAVENOUS CONTINUOUS
Status: DISCONTINUED | OUTPATIENT
Start: 2018-08-16 | End: 2018-08-17 | Stop reason: HOSPADM

## 2018-08-16 RX ORDER — GUAIFENESIN 600 MG
600 TABLET, EXTENDED RELEASE 12 HR ORAL EVERY 12 HOURS SCHEDULED
Status: DISCONTINUED | OUTPATIENT
Start: 2018-08-16 | End: 2018-08-17 | Stop reason: HOSPADM

## 2018-08-16 RX ADMIN — AMLODIPINE BESYLATE 5 MG: 5 TABLET ORAL at 18:25

## 2018-08-16 RX ADMIN — FINASTERIDE 5 MG: 5 TABLET, FILM COATED ORAL at 09:12

## 2018-08-16 RX ADMIN — LATANOPROST 1 DROP: 50 SOLUTION OPHTHALMIC at 21:52

## 2018-08-16 RX ADMIN — HYDRALAZINE HYDROCHLORIDE 25 MG: 25 TABLET, FILM COATED ORAL at 09:12

## 2018-08-16 RX ADMIN — ATORVASTATIN CALCIUM 40 MG: 40 TABLET, FILM COATED ORAL at 09:12

## 2018-08-16 RX ADMIN — DORZOLAMIDE HYDROCHLORIDE AND TIMOLOL MALEATE 1 DROP: 20; 5 SOLUTION/ DROPS OPHTHALMIC at 18:25

## 2018-08-16 RX ADMIN — HEPARIN SODIUM 5000 UNITS: 5000 INJECTION, SOLUTION INTRAVENOUS; SUBCUTANEOUS at 14:57

## 2018-08-16 RX ADMIN — PANTOPRAZOLE SODIUM 20 MG: 20 TABLET, DELAYED RELEASE ORAL at 06:09

## 2018-08-16 RX ADMIN — HYDRALAZINE HYDROCHLORIDE 25 MG: 25 TABLET, FILM COATED ORAL at 18:25

## 2018-08-16 RX ADMIN — HYDRALAZINE HYDROCHLORIDE 25 MG: 25 TABLET, FILM COATED ORAL at 12:39

## 2018-08-16 RX ADMIN — GUAIFENESIN 600 MG: 600 TABLET, EXTENDED RELEASE ORAL at 12:31

## 2018-08-16 RX ADMIN — GUAIFENESIN 600 MG: 600 TABLET, EXTENDED RELEASE ORAL at 21:50

## 2018-08-16 RX ADMIN — HYDRALAZINE HYDROCHLORIDE 25 MG: 25 TABLET, FILM COATED ORAL at 21:50

## 2018-08-16 RX ADMIN — SODIUM CHLORIDE 60 ML/HR: 0.9 INJECTION, SOLUTION INTRAVENOUS at 09:13

## 2018-08-16 RX ADMIN — METOPROLOL SUCCINATE 25 MG: 25 TABLET, EXTENDED RELEASE ORAL at 09:12

## 2018-08-16 RX ADMIN — HEPARIN SODIUM 5000 UNITS: 5000 INJECTION, SOLUTION INTRAVENOUS; SUBCUTANEOUS at 21:51

## 2018-08-16 RX ADMIN — DORZOLAMIDE HYDROCHLORIDE AND TIMOLOL MALEATE 1 DROP: 20; 5 SOLUTION/ DROPS OPHTHALMIC at 09:13

## 2018-08-16 RX ADMIN — AMLODIPINE BESYLATE 5 MG: 5 TABLET ORAL at 09:12

## 2018-08-16 RX ADMIN — HEPARIN SODIUM 5000 UNITS: 5000 INJECTION, SOLUTION INTRAVENOUS; SUBCUTANEOUS at 06:09

## 2018-08-16 RX ADMIN — ASPIRIN 81 MG: 81 TABLET, COATED ORAL at 09:12

## 2018-08-16 RX ADMIN — MINERAL OIL AND WHITE PETROLATUM: 30; 940 OINTMENT OPHTHALMIC at 22:02

## 2018-08-16 NOTE — ASSESSMENT & PLAN NOTE
· BPs uncontrolled, now improved   · Increased amlodipine to 5 mg BID   · Continue hydralazine 25 mg QID and metoprolol 25 mg daily  · PRN hydralazine for SBP >170

## 2018-08-16 NOTE — ASSESSMENT & PLAN NOTE
· Patient felt to have volume overload s/p IVFs and 40mg IV lasix given yesterday   · CXR with moderate R pleural effusion and echo pending   · Denies SOB, on room air   · Low salt diet, daily weights and I&Os    · Appreciate cardiology input

## 2018-08-16 NOTE — ASSESSMENT & PLAN NOTE
· S/p L nephrectomy >25 years ago for RCC  · Baseline creatinine:  Last known creatinine was in the mid 2 0s in 2016 but hasn't seen the nephrologist since that time  · Creatinine on admission:  4 32  · Creatinine today:  4 18  · Suspect dehydration secondary to poor oral intake, IVF were d/c and IV lasix was given  · Renal ultrasound: Left nephrectomy  Right-sided medical renal disease  No hydronephrosis  · Avoid hypotension and nephrotoxin agents    · Appreciate nephrology input, plan for 1L IVF today @ 60cc/hr

## 2018-08-16 NOTE — PLAN OF CARE
DISCHARGE PLANNING - CARE MANAGEMENT     Discharge to post-acute care or home with appropriate resources Not Progressing        GENITOURINARY - ADULT     Maintains or returns to baseline urinary function Not Progressing        INFECTION - ADULT     Absence or prevention of progression during hospitalization Not Progressing        METABOLIC, FLUID AND ELECTROLYTES - ADULT     Electrolytes maintained within normal limits Not Progressing     Fluid balance maintained Not Progressing        MUSCULOSKELETAL - ADULT     Maintain or return mobility to safest level of function Not Progressing        PAIN - ADULT     Verbalizes/displays adequate comfort level or baseline comfort level Not Progressing        Potential for Falls     Patient will remain free of falls Not Progressing        Prexisting or High Potential for Compromised Skin Integrity     Skin integrity is maintained or improved Not Progressing        SAFETY ADULT     Patient will remain free of falls Not Progressing     Maintain or return to baseline ADL function Not Progressing     Maintain or return mobility status to optimal level Not Progressing        SKIN/TISSUE INTEGRITY - ADULT     Skin integrity remains intact Not Progressing     Incision(s), wounds(s) or drain site(s) healing without S/S of infection Not Progressing

## 2018-08-16 NOTE — ASSESSMENT & PLAN NOTE
· Likely secondary to dehydration  Seems to improve as the day goes on     · Afebrile, bl cultures negative to date, UA and CXR on admission are neg for infx  · CT head negative   · Possibly 2/2 dehydration, ANGE

## 2018-08-16 NOTE — PROGRESS NOTES
Progress Note - Opal Sanford 6/14/1922, 80 y o  male MRN: [id number]  Unit/Bed#: [address] 2 [address] 220-01 Encounter: [id number] DOS: 8/16/18  Primary Care Provider: Sheridan Dobson MD   Date and time admitted to hospital: 8/13/2018  2:76 PM    * Metabolic encephalopathy   Assessment & Plan    · Likely secondary to dehydration  Seems to improve as the day goes on  · Afebrile, bl cultures negative to date, UA and CXR on admission are neg for infx  · CT head negative   · Possibly 2/2 dehydration, ANGE         Acute kidney injury ([facility] [address] )   Assessment & Plan    · S/p L nephrectomy >25 years ago for RCC  · Baseline creatinine:  Last known creatinine was in the mid 2 0s in 2016 but hasn't seen the nephrologist since that time  · Creatinine on admission:  4 32  · Creatinine today:  4 18  · Suspect dehydration secondary to poor oral intake, IVF were d/c and IV lasix was given  · Renal ultrasound: Left nephrectomy  Right-sided medical renal disease  No hydronephrosis  · Avoid hypotension and nephrotoxin agents  · Appreciate nephrology input, plan for 1L IVF today @ 60cc/hr         Volume overload   Assessment & Plan    · Patient felt to have volume overload s/p IVFs and 40mg IV lasix given yesterday   · CXR with moderate R pleural effusion and echo pending   · Denies SOB, on room air   · Low salt diet, daily weights and I&Os    · Appreciate cardiology input         Hypertensive urgency   Assessment & Plan    · BPs uncontrolled, now improved   · Increased amlodipine to 5 mg BID   · Continue hydralazine 25 mg QID and metoprolol 25 mg daily  · PRN hydralazine for SBP >170        BPH (benign prostatic hyperplasia)   Assessment & Plan    · Check post-void residuals PRN  · Continue finasteride        Hyperlipidemia   Assessment & Plan    · Continue lipitor           VTE Pharmacologic Prophylaxis:   Pharmacologic: Heparin  Mechanical VTE Prophylaxis in Place: Yes    Patient Centered Rounds: I have performed bedside rounds with nursing staff today  Discussions with Specialists or Other Care Team Provider: nursing, CM      Education and Discussions with Family / Patient: patient, called dtr marylou Elam    Time Spent for Care: 30 minutes  More than 50% of total time spent on counseling and coordination of care as described above  Current Length of Stay: 3 day(s)    Current Patient Status: Inpatient   Certification Statement: The patient will continue to require additional inpatient hospital stay due to acute renal failure, mod pleural effusion, safe d/c planning, IVF     Discharge Plan / Estimated Discharge Date: not medically stable today     Code Status: Level 1 - Full Code    Subjective:   Pt seen and examined at bedside  Oriented x 3  Denies SOB or CP  States "i am comfortable"     Objective:     Vitals:   Temp (24hrs), Av 7 °F (36 5 °C), Min:97 5 °F (36 4 °C), Max:97 8 °F (36 6 °C)    HR:  [50-69] 69  Resp:  [17-20] 17  BP: (142-179)/(72-97) 168/83  SpO2:  [90 %-95 %] 94 %  Body mass index is 25 83 kg/m²  Input and Output Summary (last 24 hours): Intake/Output Summary (Last 24 hours) at 18 0948  Last data filed at 18 0913   Gross per 24 hour   Intake              240 ml   Output             1133 ml   Net             -893 ml     Physical Exam:     Physical Exam   Constitutional: He is oriented to person, place, and time  He appears well-developed  Elderly male    HENT:   Head: Normocephalic and atraumatic  Mouth/Throat: Oropharynx is clear and moist    Eyes: EOM are normal  No scleral icterus  Left subconjunctival hemorrhage    Neck: Normal range of motion  Neck supple  Cardiovascular: Normal rate and regular rhythm  Murmur heard  Pulmonary/Chest: Effort normal and breath sounds normal    On room air, wet cough noted, decreased breath sounds R lung base    Abdominal: Soft  Bowel sounds are normal    Musculoskeletal: Normal range of motion  He exhibits edema     Neurological: He is alert and oriented to person, place, and time  Oriented x 3    Skin: Skin is warm and dry  Psychiatric: He has a normal mood and affect  Additional Data:     Labs:      Results from last 7 days  Lab Units 08/14/18  0447   WBC Thousand/uL 6 26   HEMOGLOBIN g/dL 9 6*   HEMATOCRIT % 28 9*   PLATELETS Thousands/uL 160   NEUTROS PCT % 64   LYMPHS PCT % 20   MONOS PCT % 11   EOS PCT % 5       Results from last 7 days  Lab Units 08/16/18  0454  08/13/18  1353   SODIUM mmol/L 141  < > 136   POTASSIUM mmol/L 4 2  < > 4 8   CHLORIDE mmol/L 111*  < > 105   CO2 mmol/L 22  < > 20*   BUN mg/dL 47*  < > 54*   CREATININE mg/dL 4 18*  < > 4 32*   CALCIUM mg/dL 8 5  < > 9 1   TOTAL PROTEIN g/dL  --   --  6 7   BILIRUBIN TOTAL mg/dL  --   --  0 49   ALK PHOS U/L  --   --  202*   ALT U/L  --   --  22   AST U/L  --   --  31   GLUCOSE RANDOM mg/dL 89  < > 95   < > = values in this interval not displayed  * I Have Reviewed All Lab Data Listed Above  * Additional Pertinent Lab Tests Reviewed: [address] Admission Reviewed    Imaging:    Imaging Reports Reviewed Today Include: all  Imaging Personally Reviewed by Myself Includes:  none    Recent Cultures (last 7 days):       Results from last 7 days  Lab Units 08/13/18  1833 08/13/18  1426 08/13/18  1324   BLOOD CULTURE  No Growth at 48 hrs  No Growth at 48 hrs  No Growth at 48 hrs  No Growth at 48 hrs         Last 24 Hours Medication List:     Current Facility-Administered Medications:  acetaminophen 650 mg Rectal Q4H PRN Silvia [place], DO    acetaminophen 325 mg Oral Q8H PRN Butch Prechtel, DO    amLODIPine 5 mg Oral BID Sridevi Mcgraw PA-C    artificial tear  Both Eyes HS Butch Prechtel, DO    aspirin 81 mg Oral Daily Butch Prechtel, DO    atorvastatin 40 mg Oral Daily Butch Prechtel, DO    dorzolamide-timolol 1 drop Both Eyes BID Butch Prechtel, DO    finasteride 5 mg Oral Daily Butch Prechtel, DO    guaiFENesin 600 mg Oral Q12H [facility] Reid GALLEGOS GABRIELE Huerta    heparin (porcine) 5,000 Units Subcutaneous [facility] Butch Prechtel, DO    hydrALAZINE 5 mg Intravenous Q6H PRN Mervin Mark PA-C    hydrALAZINE 25 mg Oral 4x Daily Butch Prechtel, DO    hyoscyamine 0 125 mg Oral Q4H PRN Butch Prechtel, DO    latanoprost 1 drop Right Eye HS Butch Prechtel, DO    metoprolol succinate 25 mg Oral Daily Butch Prechtel, DO    ondansetron 4 mg Intravenous Q6H PRN Butch Prechtel, DO    pantoprazole 20 mg Oral Early Morning Butch Prechtel, DO    sodium chloride 60 mL/hr Intravenous Continuous Luis Miguel Byrd MD Last Rate: 60 mL/hr (08/16/18 0913)        Today, Patient Was Seen By: Cydney Morales PA-C    ** Please Note: This note has been constructed using a voice recognition system   **

## 2018-08-16 NOTE — PROGRESS NOTES
NEPHROLOGY PROGRESS NOTE    Layla Chilel 80 y o  male MRN: [id number]  Unit/Bed#: [facility] 2 -01 Encounter: [id number]  Reason for Consult:  Acute renal failure    ASSESSMENT/PLAN:  1  Renal  Patient has acute on chronic kidney disease from 2 years ago creatinine was in the 2 level now it is up in the 4 level  A been unable to find in the record a recent creatinine  I am unsure if this is more in acute change or if it is related to some chronic progression in a solitary kidney  At this point he does not appear to have gross volume overload in my opinion  Creatinine is stable  I am going to give 1 L of normal saline slowly  Monitor response and monitor clinically  Renal ultrasound showed no hydronephrosis and solitary kidney  2   Pleural effusion  Patient has right pleural effusion on chest x-ray if this is an issue with his respiratory status I would recommend thoracentesis if it is amenable to that  3   History of solitary kidney 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 for renal carcinoma  SUBJECTIVE:  ROS  Patient is, awakened lying in bed  Really just looked at me in did not give me any verbal response to my review of system questioning  He is lying flat in no distress on his right side  OBJECTIVE:  Current Weight: Weight - Scale: 78 2 kg (172 lb 6 4 oz)  Vitals:Temp (24hrs), Av 7 °F (36 5 °C), Min:97 5 °F (36 4 °C), Max:97 8 °F (36 6 °C)  Current: Temperature: 97 8 °F (36 6 °C)   Blood pressure 168/83, pulse 69, temperature 97 8 °F (36 6 °C), temperature source Temporal, resp  rate 17, weight 78 2 kg (172 lb 6 4 oz), SpO2 94 %  , Body mass index is 25 83 kg/m²        Intake/Output Summary (Last 24 hours) at 18 0752  Last data filed at 18 0622   Gross per 24 hour   Intake          3916 67 ml   Output              935 ml   Net          2981 67 ml       Physical Exam: /83 (BP Location: Left arm)   Pulse 69   Temp 97 8 °F (36 6 °C) (Temporal)   Resp 17   Wt 78 2 kg (172 lb 6 4 oz)   SpO2 94%   BMI 25 83 kg/m²   Physical Exam   Constitutional: No distress  HENT:   Mouth/Throat: No oropharyngeal exudate  Eyes: No scleral icterus  Neck: Neck supple  No JVD present  Cardiovascular: Normal rate  Exam reveals no friction rub  Pulmonary/Chest: Effort normal  No respiratory distress  He has no wheezes  He has no rales  Decreased breath sounds right base   Abdominal: Soft  Bowel sounds are normal  He exhibits no distension  There is no tenderness  There is no rebound         Medications:    Current Facility-Administered Medications:     acetaminophen (TYLENOL) rectal suppository 650 mg, 650 mg, Rectal, Q4H PRN, Saad Ortiz DO    acetaminophen (TYLENOL) tablet 325 mg, 325 mg, Oral, Q8H PRN, Butch Prechtel, DO    amLODIPine (NORVASC) tablet 5 mg, 5 mg, Oral, BID, CLEMENTE Mohan-JOSE MANUEL, 5 mg at 08/15/18 1818    artificial tear (LUBRIFRESH P M ) ophthalmic ointment, , Both Eyes, HS, Butch Prechtel, DO    aspirin (ECOTRIN LOW STRENGTH) EC tablet 81 mg, 81 mg, Oral, Daily, Butch Prechtel, DO, 81 mg at 08/15/18 0936    atorvastatin (LIPITOR) tablet 40 mg, 40 mg, Oral, Daily, Butch Prechtel, DO, 40 mg at 08/15/18 0936    dorzolamide-timolol (COSOPT) 22 3-6 8 MG/ML ophthalmic solution 1 drop, 1 drop, Both Eyes, BID, Butch Prechtel, DO, 1 drop at 08/15/18 1819    finasteride (PROSCAR) tablet 5 mg, 5 mg, Oral, Daily, Butch Prechtel, DO, 5 mg at 08/15/18 0936    heparin (porcine) subcutaneous injection 5,000 Units, 5,000 Units, Subcutaneous, Q8H [facility], 5,000 Units at 08/16/18 0609 **AND** Platelet count, , , Once, Saad Ortiz DO    hydrALAZINE (APRESOLINE) injection 5 mg, 5 mg, Intravenous, Q6H PRN, Yovanny Zuniga PA-C, 5 mg at 08/15/18 0139    hydrALAZINE (APRESOLINE) tablet 25 mg, 25 mg, Oral, 4x Daily, Butch Prechtel, DO, 25 mg at 08/15/18 2200    hyoscyamine (LEVSIN/SL) SL tablet 0 125 mg, 0 125 mg, Oral, Q4H PRN, Butch Clay DO    latanoprost (XALATAN) 0 005 % ophthalmic solution 1 drop, 1 drop, Right Eye, HS, Butch Prechtel, DO, 1 drop at 08/15/18 2200    metoprolol succinate (TOPROL-XL) 24 hr tablet 25 mg, 25 mg, Oral, Daily, Butch Prechtel, DO, 25 mg at 08/15/18 0936    ondansetron (ZOFRAN) injection 4 mg, 4 mg, Intravenous, Q6H PRN, Butch Prechtel, DO    pantoprazole (PROTONIX) EC tablet 20 mg, 20 mg, Oral, Early Morning, Butch Prechtel, DO, 20 mg at 08/16/18 0609    sodium chloride 0 9 % infusion, 60 mL/hr, Intravenous, Continuous, Elmer Woods MD    Laboratory Results:  Lab Results   Component Value Date    WBC 6 26 08/14/2018    HGB 9 6 (L) 08/14/2018    HCT 28 9 (L) 08/14/2018    MCV 93 08/14/2018     08/14/2018     Lab Results   Component Value Date    GLUCOSE 89 08/16/2018    CALCIUM 8 5 08/16/2018     08/16/2018    K 4 2 08/16/2018    CO2 22 08/16/2018     (H) 08/16/2018    BUN 47 (H) 08/16/2018    CREATININE 4 18 (H) 08/16/2018     Lab Results   Component Value Date    CALCIUM 8 5 08/16/2018     No results found for: LABPROT

## 2018-08-17 VITALS
SYSTOLIC BLOOD PRESSURE: 143 MMHG | DIASTOLIC BLOOD PRESSURE: 66 MMHG | TEMPERATURE: 97.5 F | BODY MASS INDEX: 25.83 KG/M2 | HEART RATE: 64 BPM | WEIGHT: 172.4 LBS | RESPIRATION RATE: 22 BRPM | OXYGEN SATURATION: 98 %

## 2018-08-17 LAB
ANION GAP SERPL CALCULATED.3IONS-SCNC: 11 MMOL/L (ref 4–13)
BUN SERPL-MCNC: 47 MG/DL (ref 5–25)
CALCIUM SERPL-MCNC: 8.5 MG/DL (ref 8.3–10.1)
CHLORIDE SERPL-SCNC: 109 MMOL/L (ref 100–108)
CO2 SERPL-SCNC: 20 MMOL/L (ref 21–32)
CREAT SERPL-MCNC: 4.08 MG/DL (ref 0.6–1.3)
ERYTHROCYTE [DISTWIDTH] IN BLOOD BY AUTOMATED COUNT: 14.1 % (ref 11.6–15.1)
GFR SERPL CREATININE-BSD FRML MDRD: 12 ML/MIN/1.73SQ M
GLUCOSE SERPL-MCNC: 87 MG/DL (ref 65–140)
HCT VFR BLD AUTO: 27.7 % (ref 36.5–49.3)
HGB BLD-MCNC: 9.3 G/DL (ref 12–17)
MCH RBC QN AUTO: 31.4 PG (ref 26.8–34.3)
MCHC RBC AUTO-ENTMCNC: 33.6 G/DL (ref 31.4–37.4)
MCV RBC AUTO: 94 FL (ref 82–98)
PLATELET # BLD AUTO: 152 THOUSANDS/UL (ref 149–390)
PMV BLD AUTO: 12.4 FL (ref 8.9–12.7)
POTASSIUM SERPL-SCNC: 4.1 MMOL/L (ref 3.5–5.3)
RBC # BLD AUTO: 2.96 MILLION/UL (ref 3.88–5.62)
SODIUM SERPL-SCNC: 140 MMOL/L (ref 136–145)
WBC # BLD AUTO: 7.32 THOUSAND/UL (ref 4.31–10.16)

## 2018-08-17 PROCEDURE — 80048 BASIC METABOLIC PNL TOTAL CA: CPT | Performed by: INTERNAL MEDICINE

## 2018-08-17 PROCEDURE — 99239 HOSP IP/OBS DSCHRG MGMT >30: CPT | Performed by: HOSPITALIST

## 2018-08-17 PROCEDURE — 85027 COMPLETE CBC AUTOMATED: CPT | Performed by: PHYSICIAN ASSISTANT

## 2018-08-17 PROCEDURE — 99232 SBSQ HOSP IP/OBS MODERATE 35: CPT | Performed by: INTERNAL MEDICINE

## 2018-08-17 PROCEDURE — 93306 TTE W/DOPPLER COMPLETE: CPT | Performed by: INTERNAL MEDICINE

## 2018-08-17 RX ORDER — AMLODIPINE BESYLATE 5 MG/1
5 TABLET ORAL 2 TIMES DAILY
Qty: 60 TABLET | Refills: 0 | Status: SHIPPED | OUTPATIENT
Start: 2018-08-17 | End: 2018-09-16

## 2018-08-17 RX ORDER — MECLIZINE HYDROCHLORIDE 25 MG/1
25 TABLET ORAL ONCE
Status: COMPLETED | OUTPATIENT
Start: 2018-08-17 | End: 2018-08-17

## 2018-08-17 RX ADMIN — DORZOLAMIDE HYDROCHLORIDE AND TIMOLOL MALEATE 1 DROP: 20; 5 SOLUTION/ DROPS OPHTHALMIC at 09:12

## 2018-08-17 RX ADMIN — ATORVASTATIN CALCIUM 40 MG: 40 TABLET, FILM COATED ORAL at 09:12

## 2018-08-17 RX ADMIN — METOPROLOL SUCCINATE 25 MG: 25 TABLET, EXTENDED RELEASE ORAL at 09:12

## 2018-08-17 RX ADMIN — PANTOPRAZOLE SODIUM 20 MG: 20 TABLET, DELAYED RELEASE ORAL at 05:21

## 2018-08-17 RX ADMIN — HYDRALAZINE HYDROCHLORIDE 25 MG: 25 TABLET, FILM COATED ORAL at 09:12

## 2018-08-17 RX ADMIN — AMLODIPINE BESYLATE 5 MG: 5 TABLET ORAL at 09:12

## 2018-08-17 RX ADMIN — FINASTERIDE 5 MG: 5 TABLET, FILM COATED ORAL at 09:12

## 2018-08-17 RX ADMIN — ASPIRIN 81 MG: 81 TABLET, COATED ORAL at 09:12

## 2018-08-17 RX ADMIN — GUAIFENESIN 600 MG: 600 TABLET, EXTENDED RELEASE ORAL at 09:12

## 2018-08-17 RX ADMIN — HEPARIN SODIUM 5000 UNITS: 5000 INJECTION, SOLUTION INTRAVENOUS; SUBCUTANEOUS at 05:20

## 2018-08-17 RX ADMIN — MECLIZINE HYDROCHLORIDE 25 MG: 25 TABLET ORAL at 12:05

## 2018-08-17 NOTE — PROGRESS NOTES
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 some years ago  They did receive some baseline creatinine appears to be in the mid to high 3 range  His creatinine is been around 4 here and after fluids his sore to stabilize there so it may represent his baseline  At this point patient is stable from a renal standpoint for resumption of outpatient follow-up  Off IV fluids stable clinically in no distress  More alert mentally so the initial encephalopathy seems to have improved  SUBJECTIVE:  ROS  Patient is pleasant but when asked he really does not seem to answer questions appropriately and he really just says he wants to go home and does not have any complaints or problems  OBJECTIVE:  Current Weight: Weight - Scale: 78 2 kg (172 lb 6 4 oz)  Vitals:Temp (24hrs), Av 7 °F (36 5 °C), Min:97 5 °F (36 4 °C), Max:97 9 °F (36 6 °C)  Current: Temperature: 97 5 °F (36 4 °C)   Blood pressure (!) 185/90, pulse 64, temperature 97 5 °F (36 4 °C), temperature source Temporal, resp  rate (!) 24, weight 78 2 kg (172 lb 6 4 oz), SpO2 98 %  , Body mass index is 25 83 kg/m²  Intake/Output Summary (Last 24 hours) at 18 0954  Last data filed at 18 0529   Gross per 24 hour   Intake             1100 ml   Output              686 ml   Net              414 ml       Physical Exam: BP (!) 185/90 (BP Location: Right arm)   Pulse 64   Temp 97 5 °F (36 4 °C) (Temporal)   Resp (!) 24   Wt 78 2 kg (172 lb 6 4 oz)   SpO2 98%   BMI 25 83 kg/m²   Physical Exam   Constitutional: No distress     HENT:   Mouth/Throat: No oropharyngeal exudate  Eyes: No scleral icterus  Neck: Neck supple  No JVD present  Cardiovascular: Normal rate  Exam reveals no friction rub  Pulmonary/Chest: Effort normal and breath sounds normal  No respiratory distress  He has no wheezes  He has no rales  Abdominal: Soft  Bowel sounds are normal  He exhibits no distension  There is no tenderness  There is no rebound         Medications:    Current Facility-Administered Medications:     acetaminophen (TYLENOL) rectal suppository 650 mg, 650 mg, Rectal, Q4H PRN, Saad Ortiz DO    acetaminophen (TYLENOL) tablet 325 mg, 325 mg, Oral, Q8H PRN, Butch Prechtel, DO    amLODIPine (NORVASC) tablet 5 mg, 5 mg, Oral, BID, Yovanny Zuniga PA-C, 5 mg at 08/17/18 0603    artificial tear (LUBRIFRESH P M ) ophthalmic ointment, , Both Eyes, HS, Butch Prechtel, DO    aspirin (ECOTRIN LOW STRENGTH) EC tablet 81 mg, 81 mg, Oral, Daily, Butch Prechtel, DO, 81 mg at 08/17/18 0912    atorvastatin (LIPITOR) tablet 40 mg, 40 mg, Oral, Daily, Butch Prechtel, DO, 40 mg at 08/17/18 0912    dorzolamide-timolol (COSOPT) 22 3-6 8 MG/ML ophthalmic solution 1 drop, 1 drop, Both Eyes, BID, Butch Prechtel, DO, 1 drop at 08/17/18 0912    finasteride (PROSCAR) tablet 5 mg, 5 mg, Oral, Daily, Butch Prechtel, DO, 5 mg at 08/17/18 0912    guaiFENesin (MUCINEX) 12 hr tablet 600 mg, 600 mg, Oral, Q12H [facility], Reid Huerta PA-C, 600 mg at 08/17/18 0912    heparin (porcine) subcutaneous injection 5,000 Units, 5,000 Units, Subcutaneous, Q8H [facility], 5,000 Units at 08/17/18 0520 **AND** Platelet count, , , Once, Saad Ortiz DO    hydrALAZINE (APRESOLINE) injection 5 mg, 5 mg, Intravenous, Q6H PRN, Yovanny Zuniga PA-C, 5 mg at 08/15/18 0139    hydrALAZINE (APRESOLINE) tablet 25 mg, 25 mg, Oral, 4x Daily, Butch Prechtel, DO, 25 mg at 08/17/18 0912    hyoscyamine (LEVSIN/SL) SL tablet 0 125 mg, 0 125 mg, Oral, Q4H PRN, Butch Scottel, DO    latanoprost (XALATAN) 0 005 % ophthalmic solution 1 drop, 1 drop, Right Eye, HS, Butch Prechtel, DO, 1 drop at 08/16/18 2152    metoprolol succinate (TOPROL-XL) 24 hr tablet 25 mg, 25 mg, Oral, Daily, Butch Prechtel, DO, 25 mg at 08/17/18 0912    ondansetron (ZOFRAN) injection 4 mg, 4 mg, Intravenous, Q6H PRN, Butch Prechtel, DO    pantoprazole (PROTONIX) EC tablet 20 mg, 20 mg, Oral, Early Morning, Butch Prechtel, DO, 20 mg at 08/17/18 0521    sodium chloride 0 9 % infusion, 60 mL/hr, Intravenous, Continuous, Mervin Sales MD, Stopped at 08/17/18 0053    Laboratory Results:  Lab Results   Component Value Date    WBC 7 32 08/17/2018    HGB 9 3 (L) 08/17/2018    HCT 27 7 (L) 08/17/2018    MCV 94 08/17/2018     08/17/2018     Lab Results   Component Value Date    GLUCOSE 87 08/17/2018    CALCIUM 8 5 08/17/2018     08/17/2018    K 4 1 08/17/2018    CO2 20 (L) 08/17/2018     (H) 08/17/2018    BUN 47 (H) 08/17/2018    CREATININE 4 08 (H) 08/17/2018     Lab Results   Component Value Date    CALCIUM 8 5 08/17/2018     No results found for: LABPROT

## 2018-08-17 NOTE — PLAN OF CARE
DISCHARGE PLANNING - CARE MANAGEMENT     Discharge to post-acute care or home with appropriate resources Progressing        GENITOURINARY - ADULT     Maintains or returns to baseline urinary function Progressing        INFECTION - ADULT     Absence or prevention of progression during hospitalization Progressing        METABOLIC, FLUID AND ELECTROLYTES - ADULT     Electrolytes maintained within normal limits Progressing     Fluid balance maintained Progressing        MUSCULOSKELETAL - ADULT     Maintain or return mobility to safest level of function Progressing        PAIN - ADULT     Verbalizes/displays adequate comfort level or baseline comfort level Progressing        Potential for Falls     Patient will remain free of falls Progressing        Prexisting or High Potential for Compromised Skin Integrity     Skin integrity is maintained or improved Progressing        SAFETY ADULT     Patient will remain free of falls Progressing     Maintain or return to baseline ADL function Progressing     Maintain or return mobility status to optimal level Progressing        SKIN/TISSUE INTEGRITY - ADULT     Skin integrity remains intact Progressing     Incision(s), wounds(s) or drain site(s) healing without S/S of infection Progressing

## 2018-08-17 NOTE — ASSESSMENT & PLAN NOTE
· Patient felt to have volume overload s/p IVFs and 40mg IV lasix given Wednesday   · CXR with moderate R pleural effusion and echo results pending   · Denies SOB, on room air   · Low salt diet, daily weights and I&Os    · Appreciate cardiology input

## 2018-08-17 NOTE — NURSING NOTE
Called [facility] - spoke to Flaquito Sheets to provide report  Spoke to daughter and patient at bedside, reviewed discharge, both understand and agreeable  IVs removed, VSS, a&ox3  Patient discharged, daughter transporting back to facility

## 2018-08-17 NOTE — DISCHARGE SUMMARY
Discharge- Nam Carr 6/14/1922, 80 y o  male MRN: [id number]  Unit/Bed#: [place] [address] 2 [address] [id number] Encounter: [id number] DOS: 8/17/18  Primary Care Provider: Alana Rios MD   Date and time admitted to hospital: 8/13/2018  4:39 PM    * Metabolic encephalopathy   Assessment & Plan    · Likely secondary to dehydration  Seems to improve as the day goes on  · Afebrile, bl cultures negative to date, UA and CXR on admission are neg for infx  · CT head negative   · Possibly 2/2 dehydration, ANGE         Acute kidney injury ([facility] [address] )   Assessment & Plan    · S/p L nephrectomy >25 years ago for RCC  · Baseline creatinine:  Last known creatinine was in the mid 2 0s in 2016 but hasn't seen the nephrologist since that time  · Creatinine on admission:  4 32  · Creatinine today:  4 08  · Suspect dehydration secondary to poor oral intake, IVF were d/c and IV lasix was given  · Renal ultrasound: Left nephrectomy  Right-sided medical renal disease  No hydronephrosis  · Avoid hypotension and nephrotoxin agents  · Appreciate nephrology input, likely patients new baseline and stable for d/c        Volume overload   Assessment & Plan    · Patient felt to have volume overload s/p IVFs and 40mg IV lasix given Wednesday   · CXR with moderate R pleural effusion and echo results pending   · Denies SOB, on room air   · Low salt diet, daily weights and I&Os    · Appreciate cardiology input         Hypertensive urgency   Assessment & Plan    · BPs uncontrolled, now improved   · Increased amlodipine to 5 mg BID   · Continue hydralazine 25 mg QID and metoprolol 25 mg daily  · PRN hydralazine for SBP >170        BPH (benign prostatic hyperplasia)   Assessment & Plan    · Check post-void residuals PRN  · Continue finasteride        Hyperlipidemia   Assessment & Plan    · Continue lipitor           Discharging Physician / Practitioner: Lubna Hinojosa PA-C  PCP: Alana Rios MD  Admission Date:   Admission Orders     Ordered        08/13/18 1380 Inpatient Admission (expected length of stay for this patient is greater than two midnights)  Once             Discharge Date: 08/17/18    Resolved Problems  Date Reviewed: 8/17/2018          Resolved    Hypomagnesemia 8/15/2018     Resolved by  Gloria Vivas PA-C        Consultations During Hospital Stay:  · Cardiology  · Nephrology     Procedures Performed:   · none    Significant Findings / Test Results:   · Chest x-ray 8/13- no acute cardiopulmonary findings, asbestos-related pleural disease  · CT head- no acute intracranial abnormality, microangiopathic changes  · Ultrasound kidney and bladder-left nephrectomy, echogenic right kidney consistent with medical renal disease, no hydronephrosis  Stable right renal cysts   · Chest x-ray/50 he discusses relief release, moderate right pleural effusion  · Echocardiogram results pending  · Acute on chronic kidney disease  · Blood cultures negative to date  · Acute encephalopathy     Incidental Findings:   · none    Test Results Pending at Discharge (will require follow up): · Echocardiogram      Outpatient Tests Requested:  · Follow up with PCP 2 weeks     Complications:  none    Reason for Admission: encephalopathy     Hospital Course:     Layla Chilel is a 80 y o  male patient with PMH significant for HTN, CKD, HLD, BPH, CHF who originally presented to the hospital on 8/13/2018 due to confusion, weakness and hallucinations  Patient was found to have a KI on CKD with creatinine of for an unknown prior baseline  He was started on IV fluids but then there was concern for volume overload and IV fluids were discontinued and he was given IV Lasix  Creatinine two subsequently worsened and IV fluids were again given at a low rate for 1 L  He was seen by Cardiology and Nephrology  His creatinine only improved to 4 0 prior to discharge and it was suspected that this is patient's new baseline  His mental status improved with IV fluid hydration    CT scan of the head was negative  Chest x-ray showed a right pleural effusion but patient was asymptomatic and satting well on room air without dyspnea  Patient will be discharged back to assisted living facility today with close outpatient follow-up  Daughter expressed understanding and plans to transport him back to his facility  Please see above list of diagnoses and related plan for additional information  Condition at Discharge: stable     Discharge Day Visit / Exam:     Subjective:  Feels well  No new complaints  Was sitting up eating breakfast    Vitals: Blood Pressure: (!) 185/90 (08/17/18 0815)  Pulse: 64 (08/17/18 0815)  Temperature: 97 5 °F (36 4 °C) (08/17/18 0815)  Temp Source: Temporal (08/17/18 0815)  Respirations: (!) 24 (08/17/18 0815)  Weight - Scale: 78 2 kg (172 lb 6 4 oz) (08/16/18 0600)  SpO2: 98 % (08/17/18 0815)    Exam:   Physical Exam   Constitutional: He is oriented to person, place, and time  He appears well-developed and well-nourished  HENT:   Head: Normocephalic and atraumatic  Mouth/Throat: Oropharynx is clear and moist    Eyes: EOM are normal  No scleral icterus  Neck: Normal range of motion  Neck supple  Cardiovascular: Normal rate and regular rhythm  Murmur heard  Pulmonary/Chest: Effort normal and breath sounds normal    Dec on right    Abdominal: Soft  Bowel sounds are normal    Musculoskeletal: Normal range of motion  He exhibits no edema  Neurological: He is alert and oriented to person, place, and time  Skin: Skin is warm and dry  Psychiatric: He has a normal mood and affect  Discussion with Family: dtr at bedside     Discharge instructions/Information to patient and family:   See after visit summary for information provided to patient and family  Provisions for Follow-Up Care:  See after visit summary for information related to follow-up care and any pertinent home health orders        Disposition:     [address] at [facility]     For Discharges to [facility]:   · Not Applicable to this Patient - Not Applicable to this Patient    Planned Readmission: none     Discharge Statement:  I spent 40 minutes discharging the patient  This time was spent on the day of discharge  I had direct contact with the patient on the day of discharge  Greater than 50% of the total time was spent examining patient, answering all patient questions, arranging and discussing plan of care with patient as well as directly providing post-discharge instructions  Additional time then spent on discharge activities  Discharge Medications:  See after visit summary for reconciled discharge medications provided to patient and family        ** Please Note: This note has been constructed using a voice recognition system **

## 2018-08-17 NOTE — ASSESSMENT & PLAN NOTE
· S/p L nephrectomy >25 years ago for RCC  · Baseline creatinine:  Last known creatinine was in the mid 2 0s in 2016 but hasn't seen the nephrologist since that time  · Creatinine on admission:  4 32  · Creatinine today:  4 08  · Suspect dehydration secondary to poor oral intake, IVF were d/c and IV lasix was given  · Renal ultrasound: Left nephrectomy  Right-sided medical renal disease  No hydronephrosis  · Avoid hypotension and nephrotoxin agents    · Appreciate nephrology input, likely patients new baseline and stable for d/c

## 2018-08-17 NOTE — DISCHARGE INSTRUCTIONS
Follow up with PCP in 2 weeks  Repeat lab work cbc and bmp and send to PCP   Increased norvasc to 5mg twice a day     Acute Kidney Injury   WHAT YOU NEED TO KNOW:   Acute kidney injury (ANGE) is also called acute kidney failure, or acute renal failure  ANGE happens when your kidneys suddenly stop working correctly  Normally, the kidneys remove fluid, chemicals, and waste from your blood  These wastes are turned into urine by your kidneys  ANGE usually happens over hours or days  When you have ANGE, your kidneys do not remove the waste, chemicals, or extra fluid from your body  A normal amount of urine is not produced  ANGE is usually temporary, it can take days to months to recover  ANGE can also become a chronic kidney condition          DISCHARGE INSTRUCTIONS:   Call 911 if:   · You have sudden chest pain or trouble breathing       Seek care immediately if:   · Your symptoms get worse       Contact your healthcare provider if:   · Your symptoms return      · Your blood sugar or blood pressure level is not within the range your healthcare provider recommends      · You have questions or concerns about your condition or care  Nutrition: Your healthcare provider may tell you to eat food low in sodium (salt), potassium, phosphorus, or protein  A dietitian can help you plan your meals  Drink liquids as directed: Your healthcare provider may recommend that you drink a certain amount of liquids  This will help your kidneys work better and decrease your risk for dehydration  Ask how much liquid to drink each day and which liquids are best for you  Prevent acute kidney injury:   · Manage other health conditions such as diabetes, high blood pressure, or heart disease  These conditions increase your risk for acute kidney injury  Take your medicines for these conditions as directed  Also, monitor your blood sugar and blood pressure levels as directed   Contact your healthcare provider if your levels are not in the range he or she says it should be      · Talk to your healthcare provider before you take over-the-counter-medicine  NSAIDs, stomach medicine, or laxatives may harm your kidneys and increase your risk for acute kidney injury  If it is okay to take the medicine, follow the directions on the package  Do not take more than directed       · Tell healthcare providers you have had acute kidney injury before you get contrast liquid for an x-ray or CT scan  Your healthcare provider may give you medicine to prevent kidney problems caused by the liquid  Follow up with your healthcare provider as directed: You will need to return for more tests to make sure your kidneys are working properly  You may also be referred to a kidney specialist  Write down your questions so you remember to ask them during your visits  © 2017 [employer]  Information is for End User's use only and may not be sold, redistributed or otherwise used for commercial purposes  All illustrations and images included in CareNotes® are the copyrighted property of A [employer] M [employer]  or [employer]  The above information is an  only  It is not intended as medical advice for individual conditions or treatments  Talk to your doctor, nurse or pharmacist before following any medical regimen to see if it is safe and effective for you  Encephalopathy   WHAT YOU NEED TO KNOW:   Encephalopathy is a term used to describe brain disease or brain damage  It usually develops because of a health condition such as cirrhosis, or a brain injury  Symptoms may be mild or severe, and may be short-term or permanent  DISCHARGE INSTRUCTIONS:   Call 911 or have someone else call for any of the following:   · You cannot be woken       · You had a seizure    Seek care immediately if:   · You had a seizure      · You feel confused, dizzy, or lightheaded      · Your heart is beating faster than is normal for you       · You have sudden shortness of breath  Contact your healthcare provider if:   · You have a fever       · You are sleeping more than usual      · You have questions or concerns about your condition or care  Medicines: You may need any of the following:  · Blood pressure medicine may be given to raise or lower your blood pressure      · Antibiotics help treat an infection caused by bacteria      · A vitamin B supplement may be needed if the level in your blood is too low      · Take your medicine as directed  Contact your healthcare provider if you think your medicine is not helping or if you have side effects  Tell him or her if you are allergic to any medicine  Keep a list of the medicines, vitamins, and herbs you take  Include the amounts, and when and why you take them  Bring the list or the pill bottles to follow-up visits  Carry your medicine list with you in case of an emergency  Manage encephalopathy:   · Limit or do not drink alcohol  Alcohol can worsen your condition  Alcohol can also cause new or worsening damage to your liver and brain  Ask your healthcare provider if it is safe for you to drink alcohol  Limit alcohol to 1 drink a day if you are a woman, or 2 drinks a day if you are a man  A drink of alcohol is 12 ounces of beer, 5 ounces of wine, or 1½ ounces of liquor      · Eat low-protein and low-sodium foods, if directed  If your symptoms are caused by a liver disease, you may be asked to eat less protein and sodium (salt)  Some foods high in protein include beef, pork, poultry (chicken, turkey), beans, and nuts  Some foods high in sodium include salty snacks, canned foods, condiments, deli meats, and cured meat such as olvera  Ask your dietitian for more information about foods to limit or avoid  Follow up with your healthcare provider as directed: Write down your questions so you remember to ask them during your visits    © 2017 [employer] [place] Information is for End User's use only and may not be sold, redistributed or otherwise used for commercial purposes  All illustrations and images included in CareNotes® are the copyrighted property of A [employer] M [employer]  or [employer]  The above information is an  only  It is not intended as medical advice for individual conditions or treatments   Talk to your doctor, nurse or pharmacist before following any medical regimen to see if it is safe and effective for you

## 2018-08-18 LAB
BACTERIA BLD CULT: NORMAL